# Patient Record
Sex: MALE | Race: WHITE | Employment: FULL TIME | URBAN - METROPOLITAN AREA
[De-identification: names, ages, dates, MRNs, and addresses within clinical notes are randomized per-mention and may not be internally consistent; named-entity substitution may affect disease eponyms.]

---

## 2017-03-01 RX ORDER — CLONAZEPAM 2 MG/1
TABLET ORAL
Qty: 60 TABLET | Refills: 0 | OUTPATIENT
Start: 2017-03-01

## 2017-03-01 NOTE — TELEPHONE ENCOUNTER
Rx request for med Clonazepam 2 mg from "[employer]. Last refilled on 10/27/16. LOV 10/27/16 with Torrie Frankel PA-C for anxiety. Patient to f/u in 3 months.     Future Appointments  Date Time Provider Joellen Reid   3/2/2017 10:30 AM Merline Bame

## 2017-03-02 ENCOUNTER — OFFICE VISIT (OUTPATIENT)
Dept: FAMILY MEDICINE CLINIC | Facility: CLINIC | Age: 42
End: 2017-03-02

## 2017-03-02 ENCOUNTER — APPOINTMENT (OUTPATIENT)
Dept: LAB | Age: 42
End: 2017-03-02
Attending: FAMILY MEDICINE
Payer: COMMERCIAL

## 2017-03-02 VITALS
RESPIRATION RATE: 16 BRPM | SYSTOLIC BLOOD PRESSURE: 116 MMHG | BODY MASS INDEX: 25.16 KG/M2 | HEIGHT: 73 IN | WEIGHT: 189.81 LBS | HEART RATE: 96 BPM | DIASTOLIC BLOOD PRESSURE: 76 MMHG

## 2017-03-02 DIAGNOSIS — I10 HYPERTENSION GOAL BP (BLOOD PRESSURE) < 140/90: ICD-10-CM

## 2017-03-02 DIAGNOSIS — E11.65 TYPE 2 DIABETES MELLITUS WITH HYPERGLYCEMIA, WITHOUT LONG-TERM CURRENT USE OF INSULIN (HCC): ICD-10-CM

## 2017-03-02 DIAGNOSIS — E78.5 HYPERLIPIDEMIA WITH TARGET LDL LESS THAN 130: ICD-10-CM

## 2017-03-02 DIAGNOSIS — J45.40 MODERATE PERSISTENT ASTHMA WITHOUT COMPLICATION: ICD-10-CM

## 2017-03-02 DIAGNOSIS — M54.81 OCCIPITAL NEURALGIA, UNSPECIFIED LATERALITY: ICD-10-CM

## 2017-03-02 DIAGNOSIS — E11.65 TYPE 2 DIABETES MELLITUS WITH HYPERGLYCEMIA, WITHOUT LONG-TERM CURRENT USE OF INSULIN (HCC): Primary | ICD-10-CM

## 2017-03-02 LAB
ALBUMIN SERPL-MCNC: 4.4 G/DL (ref 3.5–4.8)
ALP LIVER SERPL-CCNC: 74 U/L (ref 45–117)
ALT SERPL-CCNC: 50 U/L (ref 17–63)
AST SERPL-CCNC: 25 U/L (ref 15–41)
BILIRUB SERPL-MCNC: 0.5 MG/DL (ref 0.1–2)
BUN BLD-MCNC: 12 MG/DL (ref 8–20)
CALCIUM BLD-MCNC: 9.4 MG/DL (ref 8.3–10.3)
CHLORIDE: 103 MMOL/L (ref 101–111)
CHOLEST SMN-MCNC: 200 MG/DL (ref ?–200)
CO2: 27 MMOL/L (ref 22–32)
CREAT BLD-MCNC: 0.96 MG/DL (ref 0.7–1.3)
CREAT UR-SCNC: 204 MG/DL
EST. AVERAGE GLUCOSE BLD GHB EST-MCNC: 163 MG/DL (ref 68–126)
GLUCOSE BLD-MCNC: 163 MG/DL (ref 70–99)
HBA1C MFR BLD HPLC: 7.3 % (ref ?–5.7)
HDLC SERPL-MCNC: 48 MG/DL (ref 45–?)
HDLC SERPL: 4.17 {RATIO} (ref ?–4.97)
LDLC SERPL CALC-MCNC: 89 MG/DL (ref ?–130)
M PROTEIN MFR SERPL ELPH: 7.6 G/DL (ref 6.1–8.3)
MICROALBUMIN UR-MCNC: 1.81 MG/DL
MICROALBUMIN/CREAT 24H UR-RTO: 8.9 UG/MG (ref ?–30)
NONHDLC SERPL-MCNC: 152 MG/DL (ref ?–130)
POTASSIUM SERPL-SCNC: 3.9 MMOL/L (ref 3.6–5.1)
SODIUM SERPL-SCNC: 139 MMOL/L (ref 136–144)
TRIGLYCERIDES: 315 MG/DL (ref ?–150)
VLDL: 63 MG/DL (ref 5–40)

## 2017-03-02 PROCEDURE — 80061 LIPID PANEL: CPT

## 2017-03-02 PROCEDURE — 82570 ASSAY OF URINE CREATININE: CPT

## 2017-03-02 PROCEDURE — 36415 COLL VENOUS BLD VENIPUNCTURE: CPT

## 2017-03-02 PROCEDURE — 99214 OFFICE O/P EST MOD 30 MIN: CPT | Performed by: PHYSICIAN ASSISTANT

## 2017-03-02 PROCEDURE — 82043 UR ALBUMIN QUANTITATIVE: CPT

## 2017-03-02 PROCEDURE — 83036 HEMOGLOBIN GLYCOSYLATED A1C: CPT

## 2017-03-02 PROCEDURE — 80053 COMPREHEN METABOLIC PANEL: CPT

## 2017-03-02 RX ORDER — CLONAZEPAM 2 MG/1
2 TABLET ORAL 2 TIMES DAILY PRN
Qty: 60 TABLET | Refills: 2 | Status: SHIPPED | OUTPATIENT
Start: 2017-03-02 | End: 2017-06-21

## 2017-03-02 RX ORDER — CARBAMAZEPINE 200 MG/1
200 TABLET ORAL 3 TIMES DAILY PRN
Qty: 270 TABLET | Refills: 3 | Status: SHIPPED | OUTPATIENT
Start: 2017-03-02 | End: 2018-02-28

## 2017-03-02 NOTE — PROGRESS NOTES
CC:  Patient presents with:  Diabetes: Due for eye exam in August. Tests daily. Asthma: ACT 24,AAP pended      HPI: Tish Flood presents for his diabetic check-up, as well as for anxiety, HTN, asthma, occipital neuralgia, and dyslipidemia.  He is doing very well, ClonazePAM 2 MG Oral Tab Take 1 tablet (2 mg total) by mouth 2 (two) times daily as needed for Anxiety. Disp: 60 tablet Rfl: 2   tadalafil 5 MG Oral Tab Take 1 tablet (5 mg total) by mouth daily as needed for Erectile Dysfunction.  Disp: 30 tablet Rfl: 0 hematuria or urgency/frequency   Musculoskeletal: No pain/weakness in arms/legs; normal range of motion   Feet: No lesions, toenail changes, or loss of sensation   Skin: No rashes or new skin lesions; no unusual moles   Neurological:  No weakness, no numbn are no barriers to learning. Medical education done. Outcome: Patient verbalizes understanding. Patient is notified to call with any questions, complications, allergies, or worsening or changing symptoms.   Patient is to call with any side effects or compli

## 2017-03-13 ENCOUNTER — TELEPHONE (OUTPATIENT)
Dept: FAMILY MEDICINE CLINIC | Facility: CLINIC | Age: 42
End: 2017-03-13

## 2017-03-13 NOTE — TELEPHONE ENCOUNTER
Called [facility] pharm, they confirmed the Rx was filled and picked up by patient on 03/02/2017 for 30 days #60 of Clonazepam 2 mgs. Do you still want a new 90 day RX to go to [facility]?

## 2017-03-13 NOTE — TELEPHONE ENCOUNTER
Received fax refill request for Clonazepam 2mg from [facility]. This medication was refilled to [facility] 3/2/17 #60 with 2 additional refills by [email or web address] BRI[facility] at an office vis 3/2/17.   Spoke with Amrit,asking if he does want this prescription t

## 2017-06-16 ENCOUNTER — PATIENT MESSAGE (OUTPATIENT)
Dept: FAMILY MEDICINE CLINIC | Facility: CLINIC | Age: 42
End: 2017-06-16

## 2017-06-16 DIAGNOSIS — Z01.818 PREOPERATIVE CLEARANCE: ICD-10-CM

## 2017-06-16 DIAGNOSIS — I10 HYPERTENSION GOAL BP (BLOOD PRESSURE) < 140/90: ICD-10-CM

## 2017-06-16 DIAGNOSIS — E78.5 HYPERLIPIDEMIA WITH TARGET LDL LESS THAN 130: ICD-10-CM

## 2017-06-16 DIAGNOSIS — E11.65 TYPE 2 DIABETES MELLITUS WITH HYPERGLYCEMIA, WITHOUT LONG-TERM CURRENT USE OF INSULIN (HCC): Primary | ICD-10-CM

## 2017-06-16 NOTE — TELEPHONE ENCOUNTER
From: Chay Vázquez  To: Khanh Stokes MD  Sent: 6/16/2017 9:42 AM CDT  Subject: Test Results Zamzam Rojas,    I need to get my blood work down before my next visit with you on July 31st. There is an ACL near me. Do I need paperwork or can it be sent electronically?     Thanks,    Miguel Avila

## 2017-06-21 RX ORDER — CLONAZEPAM 2 MG/1
2 TABLET ORAL 2 TIMES DAILY PRN
Qty: 60 TABLET | Refills: 2 | OUTPATIENT
Start: 2017-06-21 | End: 2017-07-31

## 2017-06-21 NOTE — TELEPHONE ENCOUNTER
From: Sophia Reich  To:  Gregorio Harris  Sent: 6/16/2017 9:39 AM CDT  Subject: Medication Renewal Request    Original authorizing provider: BRIA Jo would like a refill of the following medications:  ClonazePAM 2 MG Oral

## 2017-06-27 NOTE — TELEPHONE ENCOUNTER
LOV 3/2/17.  Future Appointments  Date Time Provider Joellen Reid   7/31/2017 12:30 PM Fredrick Mills MD [facility]     Refill request for Citalopram. Routed to [facility]

## 2017-06-28 RX ORDER — CITALOPRAM 40 MG/1
TABLET ORAL
Qty: 90 TABLET | Refills: 0 | Status: SHIPPED | OUTPATIENT
Start: 2017-06-28 | End: 2017-07-31

## 2017-06-29 DIAGNOSIS — E78.5 HYPERLIPIDEMIA WITH TARGET LDL LESS THAN 130: ICD-10-CM

## 2017-06-29 DIAGNOSIS — E11.65 TYPE 2 DIABETES MELLITUS WITH HYPERGLYCEMIA, WITHOUT LONG-TERM CURRENT USE OF INSULIN (HCC): ICD-10-CM

## 2017-07-03 RX ORDER — ATORVASTATIN CALCIUM 20 MG/1
TABLET, FILM COATED ORAL
Qty: 90 TABLET | Refills: 0 | Status: SHIPPED | OUTPATIENT
Start: 2017-07-03 | End: 2017-07-31

## 2017-07-03 RX ORDER — METFORMIN HYDROCHLORIDE 500 MG/1
TABLET, EXTENDED RELEASE ORAL
Qty: 180 TABLET | Refills: 0 | Status: SHIPPED | OUTPATIENT
Start: 2017-07-03 | End: 2017-07-31

## 2017-07-13 DIAGNOSIS — I10 ESSENTIAL HYPERTENSION WITH GOAL BLOOD PRESSURE LESS THAN 140/90: ICD-10-CM

## 2017-07-14 RX ORDER — QUINAPRIL 10 MG/1
10 TABLET ORAL
Qty: 30 TABLET | Refills: 0 | Status: SHIPPED
Start: 2017-07-14 | End: 2017-07-31

## 2017-07-14 NOTE — TELEPHONE ENCOUNTER
From: Julián Best  Sent: 7/13/2017 6:01 PM CDT  Subject: Medication Renewal Request    Julián Best would like a refill of the following medications:  Quinapril HCl 10 MG Oral Tab Norm BRIA Keys]    Preferred pharmacy: [place] [phone] -

## 2017-07-29 NOTE — PROGRESS NOTES
HPI:   Oksana Ryan is a 43year old male who presents for recheck of his diabetes.   Lots of stress, still seperated, better activity and diet, labs today,   Patient presents with:  Diabetes  Medication Follow-Up    Annual Physical due on 03[birth year]10/1977  Diabet 10:44 AM   GFR 98 03/02/2017 10:44 AM         Wt Readings from Last 3 Encounters:  07/31/17 : 194 lb  03/02/17 : 189 lb 12.8 oz  10/27/16 : 195 lb    BP Readings from Last 3 Encounters:  07/31/17 : 120/76  03/02/17 : 116/76  10/27/16 : 134/80        HPI exertion  CARDIOVASCULAR: denies chest pain on exertion  GI: denies abdominal pain and denies heartburn  NEURO: denies headaches    EXAM:   /76 (BP Location: Left arm)   Pulse 84   Resp 20   Ht 73\"   Wt 194 lb   BMI 25.60 kg/m²  Estimated body mass noted.   PLAN: will continue present medications, reviewed diet, exercise and weight control, and labs as ordered      As for his cholesterol, Lipids are well controlled, no significant medication side effects noted.    PLAN: will continue present medicatio External Cream      Other Visit Diagnoses     Mild persistent asthma, uncomplicated        Relevant Medications    Budesonide-Formoterol Fumarate (SYMBICORT) 160-4.5 MCG/ACT Inhalation Aerosol    Essential hypertension with goal blood pressure less than 14

## 2017-07-31 ENCOUNTER — OFFICE VISIT (OUTPATIENT)
Dept: FAMILY MEDICINE CLINIC | Facility: CLINIC | Age: 42
End: 2017-07-31

## 2017-07-31 ENCOUNTER — LAB ENCOUNTER (OUTPATIENT)
Dept: LAB | Age: 42
End: 2017-07-31
Attending: FAMILY MEDICINE
Payer: COMMERCIAL

## 2017-07-31 VITALS
BODY MASS INDEX: 25.71 KG/M2 | SYSTOLIC BLOOD PRESSURE: 120 MMHG | WEIGHT: 194 LBS | DIASTOLIC BLOOD PRESSURE: 76 MMHG | HEART RATE: 84 BPM | HEIGHT: 73 IN | RESPIRATION RATE: 20 BRPM

## 2017-07-31 DIAGNOSIS — F41.1 ANXIETY STATE: ICD-10-CM

## 2017-07-31 DIAGNOSIS — Z01.818 PREOPERATIVE CLEARANCE: ICD-10-CM

## 2017-07-31 DIAGNOSIS — I10 HYPERTENSION GOAL BP (BLOOD PRESSURE) < 140/90: ICD-10-CM

## 2017-07-31 DIAGNOSIS — E11.65 TYPE 2 DIABETES MELLITUS WITH HYPERGLYCEMIA, WITHOUT LONG-TERM CURRENT USE OF INSULIN (HCC): ICD-10-CM

## 2017-07-31 DIAGNOSIS — E78.5 HYPERLIPIDEMIA WITH TARGET LDL LESS THAN 130: ICD-10-CM

## 2017-07-31 DIAGNOSIS — J45.40 MODERATE PERSISTENT ASTHMA WITHOUT COMPLICATION: ICD-10-CM

## 2017-07-31 DIAGNOSIS — L40.9 PSORIASIS: ICD-10-CM

## 2017-07-31 DIAGNOSIS — E11.65 TYPE 2 DIABETES MELLITUS WITH HYPERGLYCEMIA, WITHOUT LONG-TERM CURRENT USE OF INSULIN (HCC): Primary | ICD-10-CM

## 2017-07-31 DIAGNOSIS — J45.30 MILD PERSISTENT ASTHMA, UNCOMPLICATED: ICD-10-CM

## 2017-07-31 DIAGNOSIS — I10 ESSENTIAL HYPERTENSION WITH GOAL BLOOD PRESSURE LESS THAN 140/90: ICD-10-CM

## 2017-07-31 PROCEDURE — 36415 COLL VENOUS BLD VENIPUNCTURE: CPT

## 2017-07-31 PROCEDURE — 83036 HEMOGLOBIN GLYCOSYLATED A1C: CPT

## 2017-07-31 PROCEDURE — 80053 COMPREHEN METABOLIC PANEL: CPT

## 2017-07-31 PROCEDURE — 80061 LIPID PANEL: CPT

## 2017-07-31 PROCEDURE — 99214 OFFICE O/P EST MOD 30 MIN: CPT | Performed by: FAMILY MEDICINE

## 2017-07-31 PROCEDURE — 85025 COMPLETE CBC W/AUTO DIFF WBC: CPT

## 2017-07-31 RX ORDER — CITALOPRAM 40 MG/1
40 TABLET ORAL
Qty: 90 TABLET | Refills: 3 | Status: SHIPPED | OUTPATIENT
Start: 2017-07-31 | End: 2018-08-29

## 2017-07-31 RX ORDER — ATORVASTATIN CALCIUM 20 MG/1
20 TABLET, FILM COATED ORAL
Qty: 90 TABLET | Refills: 3 | Status: SHIPPED | OUTPATIENT
Start: 2017-07-31 | End: 2018-10-08

## 2017-07-31 RX ORDER — QUINAPRIL 10 MG/1
10 TABLET ORAL
Qty: 90 TABLET | Refills: 3 | Status: SHIPPED | OUTPATIENT
Start: 2017-07-31 | End: 2018-07-27

## 2017-07-31 RX ORDER — METFORMIN HYDROCHLORIDE 500 MG/1
1000 TABLET, EXTENDED RELEASE ORAL
Qty: 180 TABLET | Refills: 3 | Status: SHIPPED | OUTPATIENT
Start: 2017-07-31 | End: 2018-10-08

## 2017-07-31 RX ORDER — CLONAZEPAM 2 MG/1
2 TABLET ORAL 2 TIMES DAILY PRN
Qty: 180 TABLET | Refills: 1 | Status: SHIPPED | OUTPATIENT
Start: 2017-07-31 | End: 2018-02-28

## 2017-07-31 RX ORDER — BETAMETHASONE DIPROPIONATE 0.5 MG/G
1 CREAM TOPICAL 2 TIMES DAILY
Qty: 50 G | Refills: 1 | Status: SHIPPED | OUTPATIENT
Start: 2017-07-31 | End: 2018-10-08 | Stop reason: ALTCHOICE

## 2017-07-31 RX ORDER — BUDESONIDE AND FORMOTEROL FUMARATE DIHYDRATE 160; 4.5 UG/1; UG/1
2 AEROSOL RESPIRATORY (INHALATION) 2 TIMES DAILY
Qty: 3 INHALER | Refills: 3 | Status: SHIPPED | OUTPATIENT
Start: 2017-07-31 | End: 2018-10-08

## 2017-08-01 LAB
ALBUMIN SERPL-MCNC: 4.5 G/DL (ref 3.5–4.8)
ALP LIVER SERPL-CCNC: 57 U/L (ref 45–117)
ALT SERPL-CCNC: 53 U/L (ref 17–63)
AST SERPL-CCNC: 25 U/L (ref 15–41)
BASOPHILS # BLD AUTO: 0.09 X10(3) UL (ref 0–0.1)
BASOPHILS NFR BLD AUTO: 1.4 %
BILIRUB SERPL-MCNC: 0.7 MG/DL (ref 0.1–2)
BUN BLD-MCNC: 11 MG/DL (ref 8–20)
CALCIUM BLD-MCNC: 9.4 MG/DL (ref 8.3–10.3)
CHLORIDE: 103 MMOL/L (ref 101–111)
CHOLEST SMN-MCNC: 213 MG/DL (ref ?–200)
CO2: 26 MMOL/L (ref 22–32)
CREAT BLD-MCNC: 0.84 MG/DL (ref 0.7–1.3)
EOSINOPHIL # BLD AUTO: 0.28 X10(3) UL (ref 0–0.3)
EOSINOPHIL NFR BLD AUTO: 4.3 %
ERYTHROCYTE [DISTWIDTH] IN BLOOD BY AUTOMATED COUNT: 12.8 % (ref 11.5–16)
EST. AVERAGE GLUCOSE BLD GHB EST-MCNC: 128 MG/DL (ref 68–126)
GLUCOSE BLD-MCNC: 132 MG/DL (ref 70–99)
HBA1C MFR BLD HPLC: 6.1 % (ref ?–5.7)
HCT VFR BLD AUTO: 48.4 % (ref 37–53)
HDLC SERPL-MCNC: 40 MG/DL (ref 45–?)
HDLC SERPL: 5.33 {RATIO} (ref ?–4.97)
HGB BLD-MCNC: 16.2 G/DL (ref 13–17)
IMMATURE GRANULOCYTE COUNT: 0.01 X10(3) UL (ref 0–1)
IMMATURE GRANULOCYTE RATIO %: 0.2 %
LDLC SERPL CALC-MCNC: 110 MG/DL (ref ?–130)
LDLC SERPL-MCNC: 63 MG/DL (ref 5–40)
LYMPHOCYTES # BLD AUTO: 1.78 X10(3) UL (ref 0.9–4)
LYMPHOCYTES NFR BLD AUTO: 27.3 %
M PROTEIN MFR SERPL ELPH: 7.5 G/DL (ref 6.1–8.3)
MCH RBC QN AUTO: 31.8 PG (ref 27–33.2)
MCHC RBC AUTO-ENTMCNC: 33.5 G/DL (ref 31–37)
MCV RBC AUTO: 95.1 FL (ref 80–99)
MONOCYTES # BLD AUTO: 0.36 X10(3) UL (ref 0.1–0.6)
MONOCYTES NFR BLD AUTO: 5.5 %
NEUTROPHIL ABS PRELIM: 4 X10 (3) UL (ref 1.3–6.7)
NEUTROPHILS # BLD AUTO: 4 X10(3) UL (ref 1.3–6.7)
NEUTROPHILS NFR BLD AUTO: 61.3 %
NONHDLC SERPL-MCNC: 173 MG/DL (ref ?–130)
PLATELET # BLD AUTO: 221 10(3)UL (ref 150–450)
POTASSIUM SERPL-SCNC: 4.1 MMOL/L (ref 3.6–5.1)
RBC # BLD AUTO: 5.09 X10(6)UL (ref 4.3–5.7)
RED CELL DISTRIBUTION WIDTH-SD: 45.2 FL (ref 35.1–46.3)
SODIUM SERPL-SCNC: 138 MMOL/L (ref 136–144)
TRIGLYCERIDES: 316 MG/DL (ref ?–150)
WBC # BLD AUTO: 6.5 X10(3) UL (ref 4–13)

## 2018-01-31 DIAGNOSIS — Z91.09 ENVIRONMENTAL ALLERGIES: ICD-10-CM

## 2018-01-31 DIAGNOSIS — J45.40 MODERATE PERSISTENT ASTHMA WITHOUT COMPLICATION: ICD-10-CM

## 2018-02-01 RX ORDER — ALBUTEROL SULFATE 90 UG/1
2 AEROSOL, METERED RESPIRATORY (INHALATION) EVERY 6 HOURS PRN
Qty: 1 INHALER | Refills: 2 | Status: SHIPPED | OUTPATIENT
Start: 2018-02-01 | End: 2018-02-28

## 2018-02-01 NOTE — TELEPHONE ENCOUNTER
From: Eve Show  Sent: 1/31/2018 9:32 AM CST  Subject: Medication Renewal Request    Eve Prasad would like a refill of the following medications:     Albuterol Sulfate HFA (PROAIR HFA) 108 (90 BASE) MCG/ACT Inhalation Aero Sollive Hsu MD]   Handy

## 2018-02-28 ENCOUNTER — OFFICE VISIT (OUTPATIENT)
Dept: FAMILY MEDICINE CLINIC | Facility: CLINIC | Age: 43
End: 2018-02-28

## 2018-02-28 VITALS
SYSTOLIC BLOOD PRESSURE: 132 MMHG | HEART RATE: 88 BPM | RESPIRATION RATE: 16 BRPM | BODY MASS INDEX: 24.89 KG/M2 | TEMPERATURE: 98 F | DIASTOLIC BLOOD PRESSURE: 70 MMHG | HEIGHT: 74.25 IN | WEIGHT: 196 LBS

## 2018-02-28 DIAGNOSIS — I10 HYPERTENSION GOAL BP (BLOOD PRESSURE) < 140/90: ICD-10-CM

## 2018-02-28 DIAGNOSIS — J45.40 MODERATE PERSISTENT ASTHMA WITHOUT COMPLICATION: ICD-10-CM

## 2018-02-28 DIAGNOSIS — Z13.1 ENCOUNTER FOR SCREENING EXAMINATION FOR IMPAIRED GLUCOSE REGULATION AND DIABETES MELLITUS: ICD-10-CM

## 2018-02-28 DIAGNOSIS — M54.81 OCCIPITAL NEURALGIA, UNSPECIFIED LATERALITY: ICD-10-CM

## 2018-02-28 DIAGNOSIS — Z79.899 LONG-TERM CURRENT USE OF HIGH RISK MEDICATION OTHER THAN ANTICOAGULANT: ICD-10-CM

## 2018-02-28 DIAGNOSIS — Z91.09 ENVIRONMENTAL ALLERGIES: ICD-10-CM

## 2018-02-28 DIAGNOSIS — E78.5 HYPERLIPIDEMIA WITH TARGET LDL LESS THAN 130: ICD-10-CM

## 2018-02-28 DIAGNOSIS — Z13.220 SCREENING FOR LIPID DISORDERS: ICD-10-CM

## 2018-02-28 DIAGNOSIS — Z13.29 SCREENING FOR THYROID DISORDER: ICD-10-CM

## 2018-02-28 DIAGNOSIS — R73.9 ELEVATED SERUM GLUCOSE: ICD-10-CM

## 2018-02-28 DIAGNOSIS — E11.65 TYPE 2 DIABETES MELLITUS WITH HYPERGLYCEMIA, WITHOUT LONG-TERM CURRENT USE OF INSULIN (HCC): Primary | ICD-10-CM

## 2018-02-28 DIAGNOSIS — Z00.00 LABORATORY EXAMINATION ORDERED AS PART OF A ROUTINE GENERAL MEDICAL EXAMINATION: ICD-10-CM

## 2018-02-28 DIAGNOSIS — F41.1 ANXIETY STATE: ICD-10-CM

## 2018-02-28 PROCEDURE — 99214 OFFICE O/P EST MOD 30 MIN: CPT | Performed by: FAMILY MEDICINE

## 2018-02-28 RX ORDER — CARBAMAZEPINE 200 MG/1
200 TABLET ORAL 3 TIMES DAILY PRN
Qty: 270 TABLET | Refills: 3 | Status: SHIPPED | OUTPATIENT
Start: 2018-02-28

## 2018-02-28 RX ORDER — CLONAZEPAM 2 MG/1
2 TABLET ORAL 2 TIMES DAILY PRN
Qty: 180 TABLET | Refills: 1 | Status: SHIPPED | OUTPATIENT
Start: 2018-02-28 | End: 2018-05-01

## 2018-02-28 RX ORDER — ALBUTEROL SULFATE 90 UG/1
2 AEROSOL, METERED RESPIRATORY (INHALATION) EVERY 6 HOURS PRN
Qty: 3 INHALER | Refills: 2 | Status: SHIPPED | OUTPATIENT
Start: 2018-02-28 | End: 2019-06-11

## 2018-02-28 NOTE — PROGRESS NOTES
HPI:   Robert López is a 43year old male who presents for recheck of his diabetes.     Patient presents with:  Diabetes  Asthma    Annual Physical due on 03[birth year]10/1977  Pneumococcal PPSV23 Medium Risk Adult(1 of 1 - PPSV23) due on 03/10/1994  Diabetes Care Dil PM   HDL 40 (L) 07/31/2017 01:23 PM    07/31/2017 01:23 PM   CHOLEST 213 (H) 07/31/2017 01:23 PM   TRIG 316 (H) 07/31/2017 01:23 PM   VLDL 63 (H) 07/31/2017 01:23 PM   BUN 11 07/31/2017 01:23 PM   CREATSERUM 0.84 07/31/2017 01:23 PM    07/31/ mg total) by mouth 2 (two) times daily as needed for Anxiety. [DISCONTINUED] carbamazepine 200 MG Oral Tab Take 1 tablet (200 mg total) by mouth 3 (three) times daily as needed (neuralgia pain).      No current facility-administered medications on file pr weight by increased dietary compliance and exercise, see ophthalmology soon, check feet daily and will check labs as ordered. As for his hypertension, Blood Pressure is well controlled, no significant medication side effects noted.    PLAN: will continu ClonazePAM 2 MG Oral Tab      Other Visit Diagnoses     Environmental allergies        Relevant Medications    Albuterol Sulfate HFA (PROAIR HFA) 108 (90 Base) MCG/ACT Inhalation Aero Soln    Screening for thyroid disorder        Relevant Orders    TSH W R

## 2018-05-01 DIAGNOSIS — F41.1 ANXIETY STATE: ICD-10-CM

## 2018-05-01 NOTE — TELEPHONE ENCOUNTER
From: Gala Prasad  Sent: 5/1/2018 2:06 PM CDT  Subject: Medication Renewal Request    Gala Prasad would like a refill of the following medications:     ClonazePAM 2 MG Oral Tab Aileen Seymour MD]   Patient Comment: [facility]

## 2018-05-02 RX ORDER — CLONAZEPAM 2 MG/1
2 TABLET ORAL 2 TIMES DAILY PRN
Qty: 180 TABLET | Refills: 1 | OUTPATIENT
Start: 2018-05-02 | End: 2018-10-08

## 2018-07-27 DIAGNOSIS — I10 ESSENTIAL HYPERTENSION WITH GOAL BLOOD PRESSURE LESS THAN 140/90: ICD-10-CM

## 2018-07-31 RX ORDER — QUINAPRIL 10 MG/1
TABLET ORAL
Qty: 90 TABLET | Refills: 3 | Status: SHIPPED | OUTPATIENT
Start: 2018-07-31 | End: 2019-07-28

## 2018-08-29 DIAGNOSIS — F41.1 ANXIETY STATE: ICD-10-CM

## 2018-08-29 RX ORDER — CITALOPRAM 40 MG/1
40 TABLET ORAL DAILY
Qty: 90 TABLET | Refills: 0 | Status: SHIPPED | OUTPATIENT
Start: 2018-08-29 | End: 2018-12-02

## 2018-09-01 NOTE — TELEPHONE ENCOUNTER
Spoke to patient, pt aware one refill sent to pharmacy. Patient [facility]

## 2018-10-08 ENCOUNTER — OFFICE VISIT (OUTPATIENT)
Dept: FAMILY MEDICINE CLINIC | Facility: CLINIC | Age: 43
End: 2018-10-08
Payer: COMMERCIAL

## 2018-10-08 VITALS
HEIGHT: 74 IN | WEIGHT: 194 LBS | BODY MASS INDEX: 24.9 KG/M2 | SYSTOLIC BLOOD PRESSURE: 142 MMHG | TEMPERATURE: 98 F | DIASTOLIC BLOOD PRESSURE: 90 MMHG | HEART RATE: 110 BPM | RESPIRATION RATE: 20 BRPM

## 2018-10-08 DIAGNOSIS — J45.30 MILD PERSISTENT ASTHMA, UNCOMPLICATED: ICD-10-CM

## 2018-10-08 DIAGNOSIS — J45.40 MODERATE PERSISTENT ASTHMA WITHOUT COMPLICATION: ICD-10-CM

## 2018-10-08 DIAGNOSIS — F41.1 ANXIETY STATE: ICD-10-CM

## 2018-10-08 DIAGNOSIS — I10 HYPERTENSION GOAL BP (BLOOD PRESSURE) < 140/90: ICD-10-CM

## 2018-10-08 DIAGNOSIS — E11.65 TYPE 2 DIABETES MELLITUS WITH HYPERGLYCEMIA, WITHOUT LONG-TERM CURRENT USE OF INSULIN (HCC): Primary | ICD-10-CM

## 2018-10-08 DIAGNOSIS — E78.5 HYPERLIPIDEMIA WITH TARGET LDL LESS THAN 130: ICD-10-CM

## 2018-10-08 PROCEDURE — 99214 OFFICE O/P EST MOD 30 MIN: CPT | Performed by: FAMILY MEDICINE

## 2018-10-08 PROCEDURE — 90471 IMMUNIZATION ADMIN: CPT | Performed by: FAMILY MEDICINE

## 2018-10-08 PROCEDURE — 90686 IIV4 VACC NO PRSV 0.5 ML IM: CPT | Performed by: FAMILY MEDICINE

## 2018-10-08 RX ORDER — ATORVASTATIN CALCIUM 20 MG/1
20 TABLET, FILM COATED ORAL
Qty: 90 TABLET | Refills: 3 | Status: SHIPPED | OUTPATIENT
Start: 2018-10-08 | End: 2018-10-08

## 2018-10-08 RX ORDER — BUDESONIDE AND FORMOTEROL FUMARATE DIHYDRATE 160; 4.5 UG/1; UG/1
2 AEROSOL RESPIRATORY (INHALATION) 2 TIMES DAILY
Qty: 3 INHALER | Refills: 3 | Status: SHIPPED | OUTPATIENT
Start: 2018-10-08 | End: 2019-05-17 | Stop reason: ALTCHOICE

## 2018-10-08 RX ORDER — BUDESONIDE AND FORMOTEROL FUMARATE DIHYDRATE 160; 4.5 UG/1; UG/1
2 AEROSOL RESPIRATORY (INHALATION) 2 TIMES DAILY
Qty: 3 INHALER | Refills: 3 | Status: SHIPPED | OUTPATIENT
Start: 2018-10-08 | End: 2018-10-08

## 2018-10-08 RX ORDER — METFORMIN HYDROCHLORIDE 500 MG/1
1000 TABLET, EXTENDED RELEASE ORAL
Qty: 180 TABLET | Refills: 3 | Status: SHIPPED | OUTPATIENT
Start: 2018-10-08 | End: 2018-10-08

## 2018-10-08 RX ORDER — DILTIAZEM HYDROCHLORIDE EXTENDED-RELEASE TABLETS 180 MG/1
180 TABLET, EXTENDED RELEASE ORAL DAILY
Qty: 90 TABLET | Refills: 1 | Status: SHIPPED | OUTPATIENT
Start: 2018-10-08 | End: 2019-04-09

## 2018-10-08 RX ORDER — METFORMIN HYDROCHLORIDE 500 MG/1
1000 TABLET, EXTENDED RELEASE ORAL
Qty: 180 TABLET | Refills: 3 | Status: SHIPPED | OUTPATIENT
Start: 2018-10-08 | End: 2019-11-07

## 2018-10-08 RX ORDER — ATORVASTATIN CALCIUM 20 MG/1
20 TABLET, FILM COATED ORAL
Qty: 90 TABLET | Refills: 3 | Status: SHIPPED | OUTPATIENT
Start: 2018-10-08 | End: 2019-09-23

## 2018-10-08 RX ORDER — CLONAZEPAM 2 MG/1
2 TABLET ORAL 2 TIMES DAILY PRN
Qty: 180 TABLET | Refills: 1 | Status: SHIPPED | OUTPATIENT
Start: 2018-10-08 | End: 2019-05-17

## 2018-10-08 NOTE — PROGRESS NOTES
HPI:   Patient presents with:  Diabetes    Rae Albright is a 37year old male who presents for recheck of his diabetes. Subjective    Lots of stress.    Lab Results   Component Value Date    A1C 6.1 (H) 07/31/2017   A1c 8.0 at [facility].     Annual Physical d and normal heart sounds. No murmur heard. Pulses:       Dorsalis pedis pulses are 2+ on the right side, and 2+ on the left side. Posterior tibial pulses are 2+ on the right side, and 2+ on the left side. Edema not present.   Pulmonary/Chest: E management.     Cholesterol Lowering Medications          atorvastatin 20 MG Oral Tab                 Relevant Medications    atorvastatin 20 MG Oral Tab       Respiratory    Moderate persistent asthma    Overview     Symbicort 160 BID, Proair HFA         C

## 2018-10-09 NOTE — ASSESSMENT & PLAN NOTE
As for his Diabetes, it is no significant medication side effects noted, borderline controlled.      Recommendations are: continue present meds, lose weight by increased dietary compliance and exercise, will check labs as ordered and diet and exercise, reas

## 2018-10-09 NOTE — ASSESSMENT & PLAN NOTE
Stable, Continue present management.     Cholesterol Lowering Medications          atorvastatin 20 MG Oral Tab

## 2018-12-02 DIAGNOSIS — F41.1 ANXIETY STATE: ICD-10-CM

## 2018-12-03 RX ORDER — CITALOPRAM 40 MG/1
TABLET ORAL
Qty: 90 TABLET | Refills: 1 | Status: SHIPPED | OUTPATIENT
Start: 2018-12-03 | End: 2019-05-28

## 2018-12-03 NOTE — TELEPHONE ENCOUNTER
LOV 10/8/2018     Future Appointments   Date Time Provider Joellen Jeanette   4/8/2019  1:00 PM Dia Laureano MD [facility]        Refill request for:    Requested Prescriptions     Pending Prescriptions Disp Refills   • CITALOPRAM HYDROBROMIDE 40

## 2019-04-09 DIAGNOSIS — I10 HYPERTENSION GOAL BP (BLOOD PRESSURE) < 140/90: ICD-10-CM

## 2019-04-09 RX ORDER — DILTIAZEM HYDROCHLORIDE EXTENDED-RELEASE TABLETS 180 MG/1
TABLET, EXTENDED RELEASE ORAL
Qty: 90 TABLET | Refills: 0 | Status: SHIPPED | OUTPATIENT
Start: 2019-04-09 | End: 2019-07-15

## 2019-04-09 NOTE — TELEPHONE ENCOUNTER
Medication refilled per protocol.      Requested Prescriptions     Signed Prescriptions Disp Refills   • DILTIAZEM HCL ER COATED BEADS 180 MG Oral Tablet 24 Hr 90 tablet 0     Sig: TAKE 1 TABLET DAILY     Authorizing Provider: Peggy Girard     Ordering User

## 2019-05-15 LAB
AMB EXT CHOLESTEROL, TOTAL: 234 MG/DL (ref 100–199)
AMB EXT HDL CHOLESTEROL: 49 MG/DL (ref 39–100)
AMB EXT LDL CHOLESTEROL, DIRECT: 115 MG/DL (ref 0–99)
AMB EXT TRIGLYCERIDES: 351 MG/DL (ref 0–149)
AMB EXT VLDL: 70 MG/DL (ref 5–40)

## 2019-05-17 ENCOUNTER — OFFICE VISIT (OUTPATIENT)
Dept: FAMILY MEDICINE CLINIC | Facility: CLINIC | Age: 44
End: 2019-05-17
Payer: COMMERCIAL

## 2019-05-17 VITALS
HEIGHT: 73 IN | WEIGHT: 193 LBS | BODY MASS INDEX: 25.58 KG/M2 | TEMPERATURE: 98 F | DIASTOLIC BLOOD PRESSURE: 83 MMHG | SYSTOLIC BLOOD PRESSURE: 130 MMHG | HEART RATE: 100 BPM | RESPIRATION RATE: 12 BRPM

## 2019-05-17 DIAGNOSIS — I10 ESSENTIAL HYPERTENSION: Primary | Chronic | ICD-10-CM

## 2019-05-17 DIAGNOSIS — J45.30 MILD PERSISTENT ASTHMA, UNCOMPLICATED: ICD-10-CM

## 2019-05-17 DIAGNOSIS — E11.65 TYPE 2 DIABETES MELLITUS WITH HYPERGLYCEMIA, WITHOUT LONG-TERM CURRENT USE OF INSULIN (HCC): ICD-10-CM

## 2019-05-17 DIAGNOSIS — F41.1 ANXIETY STATE: ICD-10-CM

## 2019-05-17 DIAGNOSIS — E78.5 HYPERLIPIDEMIA WITH TARGET LDL LESS THAN 130: ICD-10-CM

## 2019-05-17 DIAGNOSIS — J45.40 MODERATE PERSISTENT ASTHMA WITHOUT COMPLICATION: ICD-10-CM

## 2019-05-17 PROCEDURE — 99214 OFFICE O/P EST MOD 30 MIN: CPT | Performed by: FAMILY MEDICINE

## 2019-05-17 PROCEDURE — 83036 HEMOGLOBIN GLYCOSYLATED A1C: CPT | Performed by: FAMILY MEDICINE

## 2019-05-17 RX ORDER — FLUTICASONE PROPIONATE AND SALMETEROL 250; 50 UG/1; UG/1
1 POWDER RESPIRATORY (INHALATION) EVERY 12 HOURS SCHEDULED
Qty: 3 EACH | Refills: 4 | Status: SHIPPED | OUTPATIENT
Start: 2019-05-17

## 2019-05-17 RX ORDER — CLONAZEPAM 2 MG/1
2 TABLET ORAL 2 TIMES DAILY PRN
Qty: 180 TABLET | Refills: 1 | Status: SHIPPED | OUTPATIENT
Start: 2019-05-17 | End: 2019-06-27

## 2019-05-17 RX ORDER — AZITHROMYCIN 250 MG/1
TABLET, FILM COATED ORAL
Qty: 6 TABLET | Refills: 0 | Status: SHIPPED | OUTPATIENT
Start: 2019-05-17 | End: 2019-11-22 | Stop reason: ALTCHOICE

## 2019-05-17 NOTE — PROGRESS NOTES
HPI:   Patient presents with:  Diabetes: f/u     Oksana Ryan is a 40year old male who presents for recheck of his diabetes.   Subjective    URI sx as well  Lab Results   Component Value Date    A1C 7.5 (A) 05/17/2019       Annual Physical due on 03[birth year]10/1977 deformity. Feet: diabetic foot exam performed    Right Foot:   Protective Sensation: 6 sites tested. 6 sites sensed. Skin Integrity: Negative for ulcer. Left Foot:   Protective Sensation: 6 sites tested. 6 sites sensed.    Skin Integrity: Negative for METABOLIC PANEL (14)    LIPID PANEL    HEMOGLOBIN A1C    MICROALB/CREAT RATIO, RANDOM URINE       Other    Anxiety state    Overview     tegrotol and clonazepam, trazodone 100 and citalopram 40         Relevant Medications    clonazePAM 2 MG Oral Tab

## 2019-05-17 NOTE — ASSESSMENT & PLAN NOTE
Stable, Continue present management.     Blood Pressure and Cardiac Medications          DILTIAZEM HCL ER COATED BEADS 180 MG Oral Tablet 24 Hr    QUINAPRIL HCL 10 MG Oral Tab

## 2019-05-28 DIAGNOSIS — F41.1 ANXIETY STATE: ICD-10-CM

## 2019-05-28 RX ORDER — CITALOPRAM 40 MG/1
TABLET ORAL
Qty: 90 TABLET | Refills: 4 | Status: SHIPPED | OUTPATIENT
Start: 2019-05-28 | End: 2020-08-20

## 2019-05-28 NOTE — TELEPHONE ENCOUNTER
LOV 5/17/2019     Appointment scheduled: 11/22/2019 Damien Pina MD     Refill request for:    Requested Prescriptions     Pending Prescriptions Disp Refills   • CITALOPRAM HYDROBROMIDE 40 MG Oral Tab [Pharmacy Med Name: CITALOPRAM HBR TABS 40MG] 90 table

## 2019-06-11 ENCOUNTER — PATIENT MESSAGE (OUTPATIENT)
Dept: FAMILY MEDICINE CLINIC | Facility: CLINIC | Age: 44
End: 2019-06-11

## 2019-06-11 DIAGNOSIS — J45.40 MODERATE PERSISTENT ASTHMA WITHOUT COMPLICATION: ICD-10-CM

## 2019-06-11 DIAGNOSIS — Z91.09 ENVIRONMENTAL ALLERGIES: ICD-10-CM

## 2019-06-11 RX ORDER — ALBUTEROL SULFATE 90 UG/1
2 AEROSOL, METERED RESPIRATORY (INHALATION) EVERY 6 HOURS PRN
Qty: 3 INHALER | Refills: 2 | Status: SHIPPED | OUTPATIENT
Start: 2019-06-11 | End: 2020-08-13

## 2019-06-11 NOTE — TELEPHONE ENCOUNTER
From: Charbel Vega  To: Meet Sahni MD  Sent: 6/11/2019 1:08 PM CDT  Subject: Test Results Latoya Wolfe,    Did my lipid test every come to your office? I remember they didn't test everything they were supposed to but I haven't seen the lipid results. Thanks!  Ronald Wu

## 2019-06-11 NOTE — TELEPHONE ENCOUNTER
From: Guzman Grey  To: Jian Toledo MD  Sent: 6/11/2019 2:58 PM CDT  Subject: Prescription Question    Hello, I tried using the Request Refill option on the website. I thought I had requested my ProAir Albuterol but I got a message back that my prescription was approved for Citalopram.     That works but I need ProAir Albuterol sent to my [facility] in [place], [place] on [address]. Not sure what happened. Thanks!  Arona Ciro

## 2019-06-13 RX ORDER — ALBUTEROL SULFATE 90 UG/1
AEROSOL, METERED RESPIRATORY (INHALATION)
Qty: 8.5 G | Refills: 0 | OUTPATIENT
Start: 2019-06-13

## 2019-06-13 RX ORDER — ALBUTEROL SULFATE 90 UG/1
2 AEROSOL, METERED RESPIRATORY (INHALATION) EVERY 6 HOURS PRN
Qty: 3 INHALER | Refills: 2 | OUTPATIENT
Start: 2019-06-13

## 2019-06-13 NOTE — TELEPHONE ENCOUNTER
The proair refill was already sent to local [facility], now [facility], [facility]

## 2019-06-27 DIAGNOSIS — F41.1 ANXIETY STATE: ICD-10-CM

## 2019-06-27 RX ORDER — CLONAZEPAM 2 MG/1
2 TABLET ORAL 2 TIMES DAILY PRN
Qty: 180 TABLET | Refills: 1 | OUTPATIENT
Start: 2019-06-27 | End: 2019-12-30

## 2019-06-27 NOTE — TELEPHONE ENCOUNTER
LOV 5/17/2019         Appointment scheduled: 11/22/2019 Dia Laureano MD     Refill request for:    Requested Prescriptions     Pending Prescriptions Disp Refills   • triamcinolone acetonide 0.1 % External Cream 170 g 2     Sig: Apply topically 2 (two) charmaine

## 2019-07-15 DIAGNOSIS — I10 HYPERTENSION GOAL BP (BLOOD PRESSURE) < 140/90: ICD-10-CM

## 2019-07-16 NOTE — TELEPHONE ENCOUNTER
Pt requesting refill on diltiazem 180. lov 5.17.19, next office visit 11/22/2019. Cmp overdue/already ordered. Is it ok to refill? Please advise.  Routed to Dr Kamaljit Ma

## 2019-07-28 DIAGNOSIS — I10 ESSENTIAL HYPERTENSION WITH GOAL BLOOD PRESSURE LESS THAN 140/90: ICD-10-CM

## 2019-08-12 RX ORDER — QUINAPRIL 10 MG/1
TABLET ORAL
Qty: 90 TABLET | Refills: 3 | Status: SHIPPED | OUTPATIENT
Start: 2019-08-12 | End: 2020-07-23

## 2019-09-23 DIAGNOSIS — E78.5 HYPERLIPIDEMIA WITH TARGET LDL LESS THAN 130: ICD-10-CM

## 2019-09-23 RX ORDER — ATORVASTATIN CALCIUM 20 MG/1
TABLET, FILM COATED ORAL
Qty: 90 TABLET | Refills: 4 | Status: SHIPPED | OUTPATIENT
Start: 2019-09-23 | End: 2020-09-24

## 2019-09-23 NOTE — TELEPHONE ENCOUNTER
Requested Prescriptions     Pending Prescriptions Disp Refills   • ATORVASTATIN 20 MG Oral Tab [Pharmacy Med Name: ATORVASTATIN TABS 20MG] 90 tablet 4     Sig: TAKE 1 TABLET DAILY     LOV 5/17/2019     Patient was asked to follow-up in: 6 months    Appoint

## 2019-11-07 DIAGNOSIS — E11.65 TYPE 2 DIABETES MELLITUS WITH HYPERGLYCEMIA, WITHOUT LONG-TERM CURRENT USE OF INSULIN (HCC): ICD-10-CM

## 2019-11-08 ENCOUNTER — PATIENT MESSAGE (OUTPATIENT)
Dept: FAMILY MEDICINE CLINIC | Facility: CLINIC | Age: 44
End: 2019-11-08

## 2019-11-08 RX ORDER — METFORMIN HYDROCHLORIDE 500 MG/1
TABLET, EXTENDED RELEASE ORAL
Qty: 180 TABLET | Refills: 0 | Status: SHIPPED | OUTPATIENT
Start: 2019-11-08 | End: 2019-11-22

## 2019-11-08 NOTE — TELEPHONE ENCOUNTER
Requested Prescriptions     Pending Prescriptions Disp Refills   • METFORMIN HCL  MG Oral Tablet 24 Hr [Pharmacy Med Name: METFORMIN HCL ER TABS 500MG] 180 tablet 4     Sig: TAKE 2 TABLETS DAILY WITH BREAKFAST     LOV 5/17/2019   HgBA1C 7.5 (5/17/19)

## 2019-11-08 NOTE — TELEPHONE ENCOUNTER
From: Charbel Vega  To: Debora Johnson MD  Sent: 11/8/2019 8:53 AM CST  Subject: Other    Hello, before my appointment on 11/22. ... where can I print the Diabetes Eye Exam form? Thanks!  Ronald Wu

## 2019-11-19 ENCOUNTER — TELEPHONE (OUTPATIENT)
Dept: FAMILY MEDICINE CLINIC | Facility: CLINIC | Age: 44
End: 2019-11-19

## 2019-11-20 NOTE — TELEPHONE ENCOUNTER
Future Appointments   Date Time Provider Joellen Reid   11/22/2019  8:45 AM Xin Sarmiento MD [facility] 3 [facility]     Coming in on the 22nd, received today from [facility] 1, glucose 194, creatinine 0.85 with GFR of 106.   AST 26 ALT 40, lipids 216, Cheikh 321,

## 2019-11-22 ENCOUNTER — OFFICE VISIT (OUTPATIENT)
Dept: FAMILY MEDICINE CLINIC | Facility: CLINIC | Age: 44
End: 2019-11-22
Payer: COMMERCIAL

## 2019-11-22 VITALS
BODY MASS INDEX: 25.03 KG/M2 | RESPIRATION RATE: 16 BRPM | HEART RATE: 100 BPM | SYSTOLIC BLOOD PRESSURE: 130 MMHG | DIASTOLIC BLOOD PRESSURE: 78 MMHG | HEIGHT: 74 IN | WEIGHT: 195 LBS | TEMPERATURE: 97 F

## 2019-11-22 DIAGNOSIS — E78.2 MIXED HYPERLIPIDEMIA: ICD-10-CM

## 2019-11-22 DIAGNOSIS — Z00.00 ANNUAL PHYSICAL EXAM: Primary | ICD-10-CM

## 2019-11-22 DIAGNOSIS — E11.65 TYPE 2 DIABETES MELLITUS WITH HYPERGLYCEMIA, WITHOUT LONG-TERM CURRENT USE OF INSULIN (HCC): ICD-10-CM

## 2019-11-22 DIAGNOSIS — I10 ESSENTIAL HYPERTENSION: Chronic | ICD-10-CM

## 2019-11-22 PROCEDURE — 90471 IMMUNIZATION ADMIN: CPT | Performed by: FAMILY MEDICINE

## 2019-11-22 PROCEDURE — 99396 PREV VISIT EST AGE 40-64: CPT | Performed by: FAMILY MEDICINE

## 2019-11-22 PROCEDURE — 90686 IIV4 VACC NO PRSV 0.5 ML IM: CPT | Performed by: FAMILY MEDICINE

## 2019-11-22 PROCEDURE — 99213 OFFICE O/P EST LOW 20 MIN: CPT | Performed by: FAMILY MEDICINE

## 2019-11-22 PROCEDURE — 90472 IMMUNIZATION ADMIN EACH ADD: CPT | Performed by: FAMILY MEDICINE

## 2019-11-22 PROCEDURE — 90715 TDAP VACCINE 7 YRS/> IM: CPT | Performed by: FAMILY MEDICINE

## 2019-11-22 RX ORDER — METFORMIN HYDROCHLORIDE 500 MG/1
1000 TABLET, EXTENDED RELEASE ORAL 2 TIMES DAILY WITH MEALS
Qty: 360 TABLET | Refills: 3 | Status: SHIPPED | OUTPATIENT
Start: 2019-11-22 | End: 2020-09-18

## 2019-11-22 RX ORDER — CETIRIZINE HYDROCHLORIDE 10 MG/1
10 TABLET ORAL DAILY
COMMUNITY

## 2019-11-22 NOTE — PROGRESS NOTES
Ángel Taveras is a 40year old male who presents for a complete physical exam.   HPI:   Patient presents with:  Physical      His last annual assessment has been over 1 year: Annual Physical due on 03/10/1978       Pt complains of doing well.      He has neve 05/15/2019     07/31/2017 01:23 PM    NONHDLC 173 (H) 07/31/2017 01:23 PM       Lab Results   Component Value Date/Time    A1C 7.5 (A) 05/17/2019 10:02 AM            cetirizine 10 MG Oral Tab, Take 10 mg by mouth daily.   ATORVASTATIN 20 MG Oral Tab, seasonal.   He  reports that he has never smoked. He has never used smokeless tobacco. He reports that he does not drink alcohol or use drugs. Exercise: once per week.   Diet: watches minimally    REVIEW OF SYSTEMS:   A comprehensive 14 point review of sy no S3, no S4 and no friction rub. No murmur heard. Pulses:       Dorsalis pedis pulses are 2+ on the right side and 2+ on the left side. Posterior tibial pulses are 2+ on the right side and 2+ on the left side. Edema not present. Carotid bruit 9 Years & >, IM 12/07/2015   • FLULAVAL 6 months & older 0.5 ml Prefilled syringe ([id number]) 10/08/2018, 11/22/2019   • Influenza 10/01/2003, 12/01/2005, 10/01/2007, 10/01/2009, 10/07/2013   • TDAP 11/22/2019      Annual Physical due on 03[birth year]10/1978  Pneumococc Return in about 3 months (around 2/22/2020).     Brenton Turk MD, 11/22/2019, 9:04 AM

## 2019-11-22 NOTE — ASSESSMENT & PLAN NOTE
Stable, Continue present management.     Blood Pressure and Cardiac Medications          QUINAPRIL HCL 10 MG Oral Tab    DILTIAZEM HCL ER COATED BEADS 180 MG Oral Tablet 24 Hr

## 2019-11-22 NOTE — ASSESSMENT & PLAN NOTE
A1c 9%, going through divorce, and poor eating.  Increase to metformin 2000 ER daily, and add ozempic

## 2019-12-02 ENCOUNTER — TELEPHONE (OUTPATIENT)
Dept: FAMILY MEDICINE CLINIC | Facility: CLINIC | Age: 44
End: 2019-12-02

## 2019-12-02 NOTE — TELEPHONE ENCOUNTER
Verified dose with Dr. Reid Ortega.  Called script into [facility] and they will process script  Ozempic 0.25mg/0.5mg - 0.5mg weekly

## 2019-12-02 NOTE — TELEPHONE ENCOUNTER
[facility] verifying dose of Ozempic. There are 2 different doses on script. Please verify and send new [facility].

## 2019-12-02 NOTE — TELEPHONE ENCOUNTER
Please clarify this more, the order I have looks like it is appropriate saying 1/2 mg weekly. Is this an issue with the actual order herbal item? If it is, then we need to put a ticket in with [employer] to look at this order.   It looks like the actual o

## 2019-12-29 DIAGNOSIS — F41.1 ANXIETY STATE: ICD-10-CM

## 2019-12-30 DIAGNOSIS — I10 HYPERTENSION GOAL BP (BLOOD PRESSURE) < 140/90: ICD-10-CM

## 2019-12-30 RX ORDER — CLONAZEPAM 2 MG/1
TABLET ORAL
Qty: 180 TABLET | Refills: 0 | Status: SHIPPED | OUTPATIENT
Start: 2019-12-30 | End: 2020-09-18

## 2019-12-30 RX ORDER — DILTIAZEM HYDROCHLORIDE EXTENDED-RELEASE TABLETS 180 MG/1
TABLET, EXTENDED RELEASE ORAL
Qty: 90 TABLET | Refills: 4 | Status: SHIPPED | OUTPATIENT
Start: 2019-12-30 | End: 2021-04-23

## 2019-12-30 NOTE — TELEPHONE ENCOUNTER
Requested Prescriptions     Pending Prescriptions Disp Refills   • DILTIAZEM HCL ER COATED BEADS 180 MG Oral Tablet 24 Hr [Pharmacy Med Name: DILTIAZEM ER(CARDIZEM LA)TABS 180MG] 90 tablet 4     Sig: TAKE 1 TABLET DAILY     LOV 11/22/2019     Patient was a

## 2019-12-30 NOTE — TELEPHONE ENCOUNTER
Pt lov 11/22/19, upcoming visit on 2/21/20. Last labs on 11/18/19. Is it ok to refill clonazepam? Please advise.  Routed to Dr Yumiko Darling

## 2020-02-18 LAB
AMB EXT CREATININE: 0.92 MG/DL
AMB EXT GFR: 101
AMB EXT GLUCOSE: 142 MG/DL
AMB EXT HGBA1C: 7 %

## 2020-02-19 ENCOUNTER — TELEPHONE (OUTPATIENT)
Dept: FAMILY MEDICINE CLINIC | Facility: CLINIC | Age: 45
End: 2020-02-19

## 2020-02-19 NOTE — TELEPHONE ENCOUNTER
Received labs from [facility] for the patient in Dr. Marii Juarez' in box for review.

## 2020-02-19 NOTE — TELEPHONE ENCOUNTER
In my abstract then, please abstract but also let him know.   Next appointment is the 21st, A1c was 7.0

## 2020-02-21 ENCOUNTER — OFFICE VISIT (OUTPATIENT)
Dept: FAMILY MEDICINE CLINIC | Facility: CLINIC | Age: 45
End: 2020-02-21
Payer: COMMERCIAL

## 2020-02-21 VITALS
SYSTOLIC BLOOD PRESSURE: 110 MMHG | TEMPERATURE: 97 F | DIASTOLIC BLOOD PRESSURE: 62 MMHG | WEIGHT: 191 LBS | BODY MASS INDEX: 25.31 KG/M2 | HEIGHT: 73 IN | RESPIRATION RATE: 16 BRPM | HEART RATE: 80 BPM

## 2020-02-21 DIAGNOSIS — J45.40 MODERATE PERSISTENT ASTHMA WITHOUT COMPLICATION: ICD-10-CM

## 2020-02-21 DIAGNOSIS — E78.2 MIXED HYPERLIPIDEMIA: ICD-10-CM

## 2020-02-21 DIAGNOSIS — I10 ESSENTIAL HYPERTENSION: Chronic | ICD-10-CM

## 2020-02-21 DIAGNOSIS — E11.65 TYPE 2 DIABETES MELLITUS WITH HYPERGLYCEMIA, WITHOUT LONG-TERM CURRENT USE OF INSULIN (HCC): Primary | ICD-10-CM

## 2020-02-21 DIAGNOSIS — F41.1 ANXIETY STATE: ICD-10-CM

## 2020-02-21 PROCEDURE — 99214 OFFICE O/P EST MOD 30 MIN: CPT | Performed by: FAMILY MEDICINE

## 2020-02-21 NOTE — ASSESSMENT & PLAN NOTE
Belinda I reviewed Asthma Control Test Score and reviewed the updated Asthma Action Plan.  ACT score 24

## 2020-02-21 NOTE — PROGRESS NOTES
HPI:   Patient presents with:  Diabetes: medication f/u     Mary Shea is a 40year old male who presents for recheck of his diabetes.   Subjective    doig a lot better, A1c down from 9.4, some back pain,   Lab Results   Component Value Date    A1C 7.0 02/ the right side and 2+ on the left side. Posterior tibial pulses are 2+ on the right side and 2+ on the left side. Edema not present. Pulmonary/Chest: Effort normal and breath sounds normal. No respiratory distress. He has no wheezes.    Abdominal the updated Asthma Action Plan.  ACT score 24            Endocrine    Type 2 diabetes mellitus ([facility] [address] - Primary    Overview     Dx 2015, A1c up to 9.0 7/13/2016 so metformin  2 daily  Increased to 2000 ER and ozempic 11/22/2019          Current Assessm

## 2020-02-21 NOTE — ASSESSMENT & PLAN NOTE
As for his Diabetes, it is reasonably well controlled, no significant medication side effects noted. Recommendations are: continue present meds, lose weight by increased dietary compliance and exercise and will check labs as ordered.     Lab Results   C

## 2020-04-28 ENCOUNTER — PATIENT MESSAGE (OUTPATIENT)
Dept: FAMILY MEDICINE CLINIC | Facility: CLINIC | Age: 45
End: 2020-04-28

## 2020-04-28 NOTE — TELEPHONE ENCOUNTER
From: Maryan Goode  To: Santino Simmons MD  Sent: 4/28/2020 8:44 AM CDT  Subject: Prescription Question    Hello,    I take Ozembic weekly. I ordered from [facility] more than 8 days ago. I was due to take the injection on Sunday but it still hasn't arrived. When it does, should I immediately take a dose and then move my weekly to that day? Or should I wait until Sunday to stay on same schedule. Seems the COVID-19 has disrupted [facility].     Paul Mcdonnell

## 2020-07-01 ENCOUNTER — TELEPHONE (OUTPATIENT)
Dept: FAMILY MEDICINE CLINIC | Facility: CLINIC | Age: 45
End: 2020-07-01

## 2020-07-01 NOTE — TELEPHONE ENCOUNTER
Received fax from [email or web address], prior authorization required for Proair HFA Oral INH. Called patient and he states he ordered this through [email or web address] by mistake, orders from [address], does not want us to proceed with prior authorization. Fax shredded.

## 2020-07-08 ENCOUNTER — PATIENT MESSAGE (OUTPATIENT)
Dept: FAMILY MEDICINE CLINIC | Facility: CLINIC | Age: 45
End: 2020-07-08

## 2020-07-08 NOTE — TELEPHONE ENCOUNTER
From: Cheyanne Durham  To: Brian Hanson MD  Sent: 7/8/2020 4:14 PM CDT  Subject: Non-Urgent Medical Question    Hello,    My Ozempic arrived 2 days ago and it was as if it had been sitting in a delivery truck that was hot. The ice packs were melted and I would describe the medicine as warm. I put them in the refrigerator right away. Are these okay to use?     Thanks,  Mayda Crump

## 2020-07-10 DIAGNOSIS — Z91.09 ENVIRONMENTAL ALLERGIES: Primary | ICD-10-CM

## 2020-07-10 DIAGNOSIS — L40.9 PSORIASIS: ICD-10-CM

## 2020-07-22 DIAGNOSIS — I10 ESSENTIAL HYPERTENSION WITH GOAL BLOOD PRESSURE LESS THAN 140/90: ICD-10-CM

## 2020-07-23 RX ORDER — QUINAPRIL 10 MG/1
TABLET ORAL
Qty: 90 TABLET | Refills: 0 | Status: SHIPPED | OUTPATIENT
Start: 2020-07-23 | End: 2020-10-21

## 2020-07-23 NOTE — TELEPHONE ENCOUNTER
Requested Prescriptions     Signed Prescriptions Disp Refills   • QUINAPRIL HCL 10 MG Oral Tab 90 tablet 0     Sig: TAKE 1 TABLET DAILY     Authorizing Provider: Oksana Lloyd     Ordering User: Kenna Duval 2/21/2020         Appointment sche

## 2020-08-12 DIAGNOSIS — J45.40 MODERATE PERSISTENT ASTHMA WITHOUT COMPLICATION: ICD-10-CM

## 2020-08-12 DIAGNOSIS — Z91.09 ENVIRONMENTAL ALLERGIES: ICD-10-CM

## 2020-08-12 NOTE — TELEPHONE ENCOUNTER
Refill request for:    Requested Prescriptions     Pending Prescriptions Disp Refills   • ALBUTEROL SULFATE  (90 Base) MCG/ACT Inhalation Aero Soln [Pharmacy Med Name: ALBUTEROL HFA INHALER 8.5GM 90MCG] 25.5 g 3     Sig: USE 2 INHALATIONS EVERY 6 HO

## 2020-08-13 RX ORDER — ALBUTEROL SULFATE 90 UG/1
AEROSOL, METERED RESPIRATORY (INHALATION)
Qty: 25.5 G | Refills: 3 | Status: SHIPPED | OUTPATIENT
Start: 2020-08-13 | End: 2021-06-01

## 2020-08-19 DIAGNOSIS — F41.1 ANXIETY STATE: ICD-10-CM

## 2020-08-20 RX ORDER — CITALOPRAM 40 MG/1
TABLET ORAL
Qty: 90 TABLET | Refills: 3 | Status: SHIPPED | OUTPATIENT
Start: 2020-08-20 | End: 2021-03-03

## 2020-08-20 NOTE — TELEPHONE ENCOUNTER
Refill request for:    Requested Prescriptions     Pending Prescriptions Disp Refills   • CITALOPRAM HYDROBROMIDE 40 MG Oral Tab [Pharmacy Med Name: CITALOPRAM HBR TABS 40MG] 90 tablet 3     Sig: TAKE 1 TABLET DAILY        Last Prescribed Quantity Refills

## 2020-09-18 ENCOUNTER — OFFICE VISIT (OUTPATIENT)
Dept: FAMILY MEDICINE CLINIC | Facility: CLINIC | Age: 45
End: 2020-09-18
Payer: COMMERCIAL

## 2020-09-18 VITALS
BODY MASS INDEX: 22.84 KG/M2 | HEIGHT: 73 IN | SYSTOLIC BLOOD PRESSURE: 124 MMHG | TEMPERATURE: 98 F | DIASTOLIC BLOOD PRESSURE: 70 MMHG | WEIGHT: 172.31 LBS | RESPIRATION RATE: 18 BRPM

## 2020-09-18 DIAGNOSIS — Z00.00 LABORATORY EXAMINATION ORDERED AS PART OF A ROUTINE GENERAL MEDICAL EXAMINATION: ICD-10-CM

## 2020-09-18 DIAGNOSIS — Z51.81 MEDICATION MONITORING ENCOUNTER: ICD-10-CM

## 2020-09-18 DIAGNOSIS — E11.65 TYPE 2 DIABETES MELLITUS WITH HYPERGLYCEMIA, WITHOUT LONG-TERM CURRENT USE OF INSULIN (HCC): Primary | ICD-10-CM

## 2020-09-18 DIAGNOSIS — E78.2 MIXED HYPERLIPIDEMIA: ICD-10-CM

## 2020-09-18 DIAGNOSIS — F41.1 ANXIETY STATE: ICD-10-CM

## 2020-09-18 DIAGNOSIS — I10 ESSENTIAL HYPERTENSION: Chronic | ICD-10-CM

## 2020-09-18 DIAGNOSIS — J45.40 MODERATE PERSISTENT ASTHMA WITHOUT COMPLICATION: ICD-10-CM

## 2020-09-18 LAB
CARTRIDGE LOT#: 671 NUMERIC
HEMOGLOBIN A1C: 5.4 % (ref 4.3–5.6)

## 2020-09-18 PROCEDURE — 99214 OFFICE O/P EST MOD 30 MIN: CPT | Performed by: FAMILY MEDICINE

## 2020-09-18 PROCEDURE — 90471 IMMUNIZATION ADMIN: CPT | Performed by: FAMILY MEDICINE

## 2020-09-18 PROCEDURE — 3008F BODY MASS INDEX DOCD: CPT | Performed by: FAMILY MEDICINE

## 2020-09-18 PROCEDURE — 90686 IIV4 VACC NO PRSV 0.5 ML IM: CPT | Performed by: FAMILY MEDICINE

## 2020-09-18 PROCEDURE — 3074F SYST BP LT 130 MM HG: CPT | Performed by: FAMILY MEDICINE

## 2020-09-18 PROCEDURE — 83036 HEMOGLOBIN GLYCOSYLATED A1C: CPT | Performed by: FAMILY MEDICINE

## 2020-09-18 PROCEDURE — 3078F DIAST BP <80 MM HG: CPT | Performed by: FAMILY MEDICINE

## 2020-09-18 RX ORDER — CLONAZEPAM 2 MG/1
2 TABLET ORAL 2 TIMES DAILY PRN
Qty: 180 TABLET | Refills: 1 | Status: SHIPPED | OUTPATIENT
Start: 2020-09-18 | End: 2021-03-04

## 2020-09-18 RX ORDER — METFORMIN HYDROCHLORIDE 500 MG/1
500 TABLET, EXTENDED RELEASE ORAL 2 TIMES DAILY WITH MEALS
Qty: 360 TABLET | Refills: 3 | COMMUNITY
Start: 2020-09-18 | End: 2020-12-29

## 2020-09-18 NOTE — PATIENT INSTRUCTIONS
Mole skin, in a donut around the ball of both feet. Treating Metatarsalgia    Metatarsalgia is pain in the \"ball of your foot,\" the area between your arch and your toes.  The pain usually starts in one or more of the five bones in this area under the to shoes with thin soles and high heels. This puts extra pressure on the bones in the ball of the foot. Standing or walking on a hard surface for long periods also puts added pressure on the bones, causing pain.  The pain can occur under any of the five metata

## 2020-09-18 NOTE — PROGRESS NOTES
HPI:   Patient presents with:  Diabetes: follow up. Luis Daniel Walker is a 39year old male who presents for recheck of his diabetes. Subjective    Much better diet, swelling on bottom of feet. ,   Last A1c value was 5.4% done 9/18/2020.      Pneumococcal PP normal, S2 normal and normal heart sounds. No murmur heard. Pulses:       Posterior tibial pulses are 2+ on the right side and 2+ on the left side. Edema not present. Pulmonary/Chest: Effort normal and breath sounds normal. No respiratory distress. lose weight by increased dietary compliance and exercise and will check labs as ordered.     Lab Results   Component Value Date    A1C 5.4 09/18/2020    A1C 7.0 02/18/2020      Blood Sugar Medications          Semaglutide, 1 MG/DOSE, (OZEMPIC, 1 MG/DOSE,) 2

## 2020-09-22 DIAGNOSIS — E78.5 HYPERLIPIDEMIA WITH TARGET LDL LESS THAN 130: ICD-10-CM

## 2020-09-24 RX ORDER — ATORVASTATIN CALCIUM 20 MG/1
TABLET, FILM COATED ORAL
Qty: 90 TABLET | Refills: 3 | Status: SHIPPED | OUTPATIENT
Start: 2020-09-24 | End: 2021-08-12

## 2020-09-24 NOTE — TELEPHONE ENCOUNTER
ATORVASTATIN TABS 20MG     Sig: TAKE 1 TABLET DAILY    Disp:  90 tablet    Refills:  3    Cholesterol Medication Protocol [id number]/22/2020 11:59 PM   ALT < 80 Protocol Details    ALT resulted within past year

## 2020-09-24 NOTE — TELEPHONE ENCOUNTER
Labs ordered 9/18/20 not completed  LOV 9/18-20 with Dr Rosales Congress  Mixed hyperlipidemia      Overview       Atorvastatin 20           Current Assessment & Plan       Stable, Continue present management.         Cholesterol Lowering Medications

## 2020-10-20 DIAGNOSIS — I10 ESSENTIAL HYPERTENSION WITH GOAL BLOOD PRESSURE LESS THAN 140/90: ICD-10-CM

## 2020-10-21 RX ORDER — QUINAPRIL 10 MG/1
TABLET ORAL
Qty: 90 TABLET | Refills: 0 | Status: SHIPPED | OUTPATIENT
Start: 2020-10-21 | End: 2021-04-23

## 2020-10-21 NOTE — TELEPHONE ENCOUNTER
Requested Prescriptions     Pending Prescriptions Disp Refills   • QUINAPRIL HCL 10 MG Oral Tab [Pharmacy Med Name: QUINAPRIL TABS 10MG] 90 tablet 3     Sig: TAKE 1 TABLET DAILY     LOV 9/18/2020         Appointment scheduled: 3/18/2021 Anjel Caro MD

## 2020-10-22 ENCOUNTER — TELEPHONE (OUTPATIENT)
Dept: FAMILY MEDICINE CLINIC | Facility: CLINIC | Age: 45
End: 2020-10-22

## 2020-10-22 NOTE — TELEPHONE ENCOUNTER
AMOL for pt, Joyce Chaney [facility]  is calling patient reported to her that pt has some sores on his feet and unable to walk. She wants us to reach out to the patient.

## 2020-12-27 DIAGNOSIS — E11.65 TYPE 2 DIABETES MELLITUS WITH HYPERGLYCEMIA, WITHOUT LONG-TERM CURRENT USE OF INSULIN (HCC): ICD-10-CM

## 2020-12-29 RX ORDER — METFORMIN HYDROCHLORIDE 500 MG/1
TABLET, EXTENDED RELEASE ORAL
Qty: 360 TABLET | Refills: 3 | Status: SHIPPED | OUTPATIENT
Start: 2020-12-29

## 2020-12-30 NOTE — TELEPHONE ENCOUNTER
Requested Prescriptions     Pending Prescriptions Disp Refills   • METFORMIN HCL  MG Oral Tablet 24 Hr [Pharmacy Med Name: METFORMIN HCL ER TABS 500MG] 360 tablet 3     Sig: TAKE 2 TABLETS TWICE A DAY WITH MEALS     LOV 9/18/2020     Patient was aske

## 2021-03-03 DIAGNOSIS — F41.1 ANXIETY STATE: ICD-10-CM

## 2021-03-04 DIAGNOSIS — F41.1 ANXIETY STATE: ICD-10-CM

## 2021-03-05 RX ORDER — CITALOPRAM 40 MG/1
40 TABLET ORAL DAILY
Qty: 90 TABLET | Refills: 3 | Status: SHIPPED | OUTPATIENT
Start: 2021-03-05 | End: 2021-10-14

## 2021-03-05 RX ORDER — CLONAZEPAM 2 MG/1
2 TABLET ORAL 2 TIMES DAILY PRN
Qty: 180 TABLET | Refills: 1 | Status: SHIPPED | OUTPATIENT
Start: 2021-03-05 | End: 2022-01-19

## 2021-03-05 NOTE — TELEPHONE ENCOUNTER
Refill request for:    Requested Prescriptions     Pending Prescriptions Disp Refills   • clonazePAM 2 MG Oral Tab 180 tablet 1     Sig: Take 1 tablet (2 mg total) by mouth 2 (two) times daily as needed for Anxiety.         Last Prescribed Quantity Refills

## 2021-03-05 NOTE — TELEPHONE ENCOUNTER
Refill request for:    Requested Prescriptions     Pending Prescriptions Disp Refills   • Citalopram Hydrobromide 40 MG Oral Tab 90 tablet 3     Sig: Take 1 tablet (40 mg total) by mouth daily.         Last Prescribed Quantity Refills   8/20/2020 90 3     L

## 2021-03-22 ENCOUNTER — PATIENT MESSAGE (OUTPATIENT)
Dept: FAMILY MEDICINE CLINIC | Facility: CLINIC | Age: 46
End: 2021-03-22

## 2021-03-22 DIAGNOSIS — Z00.00 BLOOD TESTS FOR ROUTINE GENERAL PHYSICAL EXAMINATION: ICD-10-CM

## 2021-03-22 DIAGNOSIS — E11.65 TYPE 2 DIABETES MELLITUS WITH HYPERGLYCEMIA, WITHOUT LONG-TERM CURRENT USE OF INSULIN (HCC): Primary | ICD-10-CM

## 2021-03-22 DIAGNOSIS — Z51.81 MEDICATION MONITORING ENCOUNTER: ICD-10-CM

## 2021-03-22 DIAGNOSIS — E78.2 MIXED HYPERLIPIDEMIA: ICD-10-CM

## 2021-03-22 DIAGNOSIS — I10 ESSENTIAL HYPERTENSION: ICD-10-CM

## 2021-03-22 NOTE — TELEPHONE ENCOUNTER
From: Oksana Ryan  To: Bhaskar Zee MD  Sent: 3/22/2021 10:18 AM CDT  Subject: Test Results Question    Hello. Can you confirm that [facility] has my blood test order?  Also, is it possible to add Testosterone testing to the test?

## 2021-03-22 NOTE — TELEPHONE ENCOUNTER
Reissued lab orders at [address] as requested by pt for A1C, Microalbumin,lipid,CMP,CBC and Tegretol level. Pt aware.

## 2021-03-29 ENCOUNTER — PATIENT MESSAGE (OUTPATIENT)
Dept: FAMILY MEDICINE CLINIC | Facility: CLINIC | Age: 46
End: 2021-03-29

## 2021-04-23 DIAGNOSIS — I10 ESSENTIAL HYPERTENSION WITH GOAL BLOOD PRESSURE LESS THAN 140/90: ICD-10-CM

## 2021-04-23 DIAGNOSIS — I10 HYPERTENSION GOAL BP (BLOOD PRESSURE) < 140/90: ICD-10-CM

## 2021-04-23 RX ORDER — SEMAGLUTIDE 1.34 MG/ML
0.5 INJECTION, SOLUTION SUBCUTANEOUS WEEKLY
Qty: 6 PEN | Refills: 3 | Status: CANCELLED | OUTPATIENT
Start: 2021-04-23

## 2021-04-26 RX ORDER — SEMAGLUTIDE 1.34 MG/ML
0.5 INJECTION, SOLUTION SUBCUTANEOUS WEEKLY
Qty: 6 PEN | Refills: 3 | Status: CANCELLED | OUTPATIENT
Start: 2021-04-26

## 2021-04-27 ENCOUNTER — PATIENT MESSAGE (OUTPATIENT)
Dept: FAMILY MEDICINE CLINIC | Facility: CLINIC | Age: 46
End: 2021-04-27

## 2021-04-27 ENCOUNTER — TELEPHONE (OUTPATIENT)
Dept: FAMILY MEDICINE CLINIC | Facility: CLINIC | Age: 46
End: 2021-04-27

## 2021-04-27 RX ORDER — SEMAGLUTIDE 1.34 MG/ML
0.5 INJECTION, SOLUTION SUBCUTANEOUS WEEKLY
Qty: 6 PEN | Refills: 3 | Status: SHIPPED | OUTPATIENT
Start: 2021-04-27 | End: 2021-05-05

## 2021-04-27 NOTE — TELEPHONE ENCOUNTER
From: Zoraida Mohan  To: Silas Barrow MD  Sent: 4/27/2021 8:16 AM CDT  Subject: Prescription Question    Hello,    I have requested refills of Ozembic but it does not appear to be going through.  I put in the request through the web site but do not see any so

## 2021-04-27 NOTE — TELEPHONE ENCOUNTER
Received fax from [employer] requesting a Prior Authorization on Ozempic 2 mg/1.5ml pen injectors.   Pt informed of process    KEY:[id number]    Form in Pat's inbox

## 2021-04-28 ENCOUNTER — TELEPHONE (OUTPATIENT)
Dept: FAMILY MEDICINE CLINIC | Facility: CLINIC | Age: 46
End: 2021-04-28

## 2021-04-28 RX ORDER — DILTIAZEM HYDROCHLORIDE EXTENDED-RELEASE TABLETS 180 MG/1
180 TABLET, EXTENDED RELEASE ORAL DAILY
Qty: 90 TABLET | Refills: 3 | Status: SHIPPED | OUTPATIENT
Start: 2021-04-28 | End: 2021-09-09 | Stop reason: ALTCHOICE

## 2021-04-28 RX ORDER — QUINAPRIL 10 MG/1
10 TABLET ORAL DAILY
Qty: 90 TABLET | Refills: 3 | Status: SHIPPED | OUTPATIENT
Start: 2021-04-28

## 2021-04-28 NOTE — TELEPHONE ENCOUNTER
Received fax from [facility] requiring a Prior Authorization on med Matzim LA I 80 mg tabs. Pt not sure what medication this is?  Form in Pat's inbox    BEAVER:[id number]

## 2021-04-28 NOTE — TELEPHONE ENCOUNTER
Requested Prescriptions     Pending Prescriptions Disp Refills   • dilTIAZem HCl ER Coated Beads 180 MG Oral Tablet 24 Hr 90 tablet 4     Sig: Take 1 tablet (180 mg total) by mouth daily.    • Quinapril HCl 10 MG Oral Tab 90 tablet 0     Sig: Take 1 tablet

## 2021-04-29 RX ORDER — SEMAGLUTIDE 1.34 MG/ML
INJECTION, SOLUTION SUBCUTANEOUS
COMMUNITY
Start: 2020-11-04 | End: 2021-04-29 | Stop reason: DRUGHIGH

## 2021-04-29 NOTE — TELEPHONE ENCOUNTER
Processed through [address]: [id number] – [place] Case ID: [id number] – Rx #: [id number]    [facility] be changed to diltiazem ER, answered yes because that is exactly what we sent in the scrip for  Not dulce maria Matzem as [facility] pharmacy is request

## 2021-04-29 NOTE — TELEPHONE ENCOUNTER
Completed all info in [facility] and sent to insurance  Padilla Murphy (Garrett: [id number])  Rx #: [id number]

## 2021-04-29 NOTE — TELEPHONE ENCOUNTER
Coverage for brand Matzem has been denied because we indicated that generic Diltiazem ER is a valid alternative, which we originally ordered   Please advise patient to contact his pharmacy and move forward with filling the generic diltiazem

## 2021-05-05 ENCOUNTER — TELEPHONE (OUTPATIENT)
Dept: FAMILY MEDICINE CLINIC | Facility: CLINIC | Age: 46
End: 2021-05-05

## 2021-05-05 RX ORDER — SEMAGLUTIDE 1.34 MG/ML
0.5 INJECTION, SOLUTION SUBCUTANEOUS WEEKLY
Qty: 6 PEN | Refills: 3 | Status: SHIPPED | OUTPATIENT
Start: 2021-05-05 | End: 2021-05-10

## 2021-05-05 NOTE — TELEPHONE ENCOUNTER
Please notify:    Requested Prescriptions     Signed Prescriptions Disp Refills   • Semaglutide, 1 MG/DOSE, (OZEMPIC, 1 MG/DOSE,) 2 MG/1.5ML Subcutaneous Solution Pen-injector 6 pen 3     Sig: Inject 0.5 mL into the skin once a week.      Authorizing Provid

## 2021-05-05 NOTE — TELEPHONE ENCOUNTER
Pharmacist states the directions on the script does not match the dose. They need a new script with 0.25/0.5 ml in order to be able to administer 0.5ml as directed. He would not be able to do so with 1 ml ordered.

## 2021-05-10 ENCOUNTER — PATIENT MESSAGE (OUTPATIENT)
Dept: FAMILY MEDICINE CLINIC | Facility: CLINIC | Age: 46
End: 2021-05-10

## 2021-05-10 DIAGNOSIS — E11.65 TYPE 2 DIABETES MELLITUS WITH HYPERGLYCEMIA, WITHOUT LONG-TERM CURRENT USE OF INSULIN (HCC): Primary | ICD-10-CM

## 2021-05-10 RX ORDER — SEMAGLUTIDE 1.34 MG/ML
0.5 INJECTION, SOLUTION SUBCUTANEOUS WEEKLY
Qty: 4 PEN | Refills: 3 | Status: SHIPPED | OUTPATIENT
Start: 2021-05-10

## 2021-05-10 NOTE — TELEPHONE ENCOUNTER
From: Armando Cordero  To: Ame Juarez MD  Sent: 5/10/2021 8:19 AM CDT  Subject: Prescription Question    Hi,    We've been communicating about getting Ozembic generic refilled for over a week.  [facility] to [facility] and they said the prescription sent was for 1mg b

## 2021-05-10 NOTE — TELEPHONE ENCOUNTER
1. Type 2 diabetes mellitus with hyperglycemia, without long-term current use of insulin ([facility] [address] (Primary)  Overview:  Dx 2015, A1c up to 9.0 7/13/2016. metformin  2 daily   and ozempic 11/22/2019   Orders:  -     Ozempic (0.25 or 0.5 MG/DOSE);  Inject

## 2021-05-10 NOTE — TELEPHONE ENCOUNTER
Spoke with [facility] pharmacist.    If you want pt's Ozempic dosage to be 0.5 ml weekly, you have to prescribe Ozempic 0.25/0.5 mg pen. The 1mg pen dose not have the ability to dose 0.5 ml. Routed to Dr Lei Allan.

## 2021-06-01 DIAGNOSIS — J45.40 MODERATE PERSISTENT ASTHMA WITHOUT COMPLICATION: ICD-10-CM

## 2021-06-01 DIAGNOSIS — Z91.09 ENVIRONMENTAL ALLERGIES: ICD-10-CM

## 2021-06-08 RX ORDER — ALBUTEROL SULFATE 90 UG/1
2 AEROSOL, METERED RESPIRATORY (INHALATION) EVERY 6 HOURS PRN
Qty: 25.5 G | Refills: 3 | Status: SHIPPED | OUTPATIENT
Start: 2021-06-08

## 2021-06-08 NOTE — TELEPHONE ENCOUNTER
Requested Prescriptions     Pending Prescriptions Disp Refills   • Albuterol Sulfate  (90 Base) MCG/ACT Inhalation Aero Soln 25.5 g 3     Sig: Inhale 2 puffs into the lungs every 6 (six) hours as needed for Wheezing.      LOV 9/18/2020     Patient wa

## 2021-06-08 NOTE — TELEPHONE ENCOUNTER
Medication refilled per protocol.      Requested Prescriptions     Signed Prescriptions Disp Refills   • Albuterol Sulfate  (90 Base) MCG/ACT Inhalation Aero Soln 25.5 g 3     Sig: Inhale 2 puffs into the lungs every 6 (six) hours as needed for [place]

## 2021-06-29 DIAGNOSIS — F41.1 ANXIETY STATE: ICD-10-CM

## 2021-07-02 NOTE — TELEPHONE ENCOUNTER
Refill request for:    Requested Prescriptions     Pending Prescriptions Disp Refills   • CITALOPRAM HYDROBROMIDE 40 MG Oral Tab [Pharmacy Med Name: CITALOPRAM TAB 40MG] 90 tablet 3     Sig: TAKE 1 TABLET DAILY        Last Prescribed Quantity Refills   3/5

## 2021-07-06 RX ORDER — CITALOPRAM 40 MG/1
TABLET ORAL
Qty: 90 TABLET | Refills: 3 | OUTPATIENT
Start: 2021-07-06

## 2021-08-05 DIAGNOSIS — E78.5 HYPERLIPIDEMIA WITH TARGET LDL LESS THAN 130: ICD-10-CM

## 2021-08-12 RX ORDER — ATORVASTATIN CALCIUM 20 MG/1
TABLET, FILM COATED ORAL
Qty: 90 TABLET | Refills: 0 | Status: SHIPPED | OUTPATIENT
Start: 2021-08-12 | End: 2021-11-02

## 2021-08-12 NOTE — TELEPHONE ENCOUNTER
ATORVASTATIN TAB 20MG     Sig: TAKE 1 TABLET DAILY    Disp:  90 tablet    Refills:  3    Start: 8/5/2021    Class: Normal    Non-formulary For: Hyperlipidemia with target LDL less than 130    Last ordered: 10 months ago by Ashwin Mae MD Last refill: 5/16

## 2021-08-12 NOTE — TELEPHONE ENCOUNTER
LOV 08/18/2020 diabetes w/Deepali  Last lab 9/18.2020  No upcoming OV scheduled  Dr Salma Lopes requested Return in about 6 months (around 3/18/2021) for diabetes follow up.

## 2021-08-18 NOTE — TELEPHONE ENCOUNTER
Left message notifying patient that his medication is refilled. And he does need to call us back to schedule a appointment.

## 2021-08-23 ENCOUNTER — PATIENT MESSAGE (OUTPATIENT)
Dept: FAMILY MEDICINE CLINIC | Facility: CLINIC | Age: 46
End: 2021-08-23

## 2021-08-23 NOTE — TELEPHONE ENCOUNTER
From: Natacha Jain  To: Argelia Valdez MD  Sent: 8/23/2021 9:55 AM CDT  Subject: Test Results Question    Hello,    I have a visit with Dr. Jesus Thurman on 9/9. Can you confirm my [facility] bloodwork order is still valid? Thanks!   Troy Mccarthy

## 2021-09-07 PROCEDURE — 3061F NEG MICROALBUMINURIA REV: CPT | Performed by: FAMILY MEDICINE

## 2021-09-07 PROCEDURE — 3044F HG A1C LEVEL LT 7.0%: CPT | Performed by: FAMILY MEDICINE

## 2021-09-08 LAB
ABSOLUTE BASOPHILS: 91 CELLS/UL (ref 0–200)
ABSOLUTE EOSINOPHILS: 280 CELLS/UL (ref 15–500)
ABSOLUTE LYMPHOCYTES: 2352 CELLS/UL (ref 850–3900)
ABSOLUTE MONOCYTES: 413 CELLS/UL (ref 200–950)
ABSOLUTE NEUTROPHILS: 3864 CELLS/UL (ref 1500–7800)
ALBUMIN/GLOBULIN RATIO: 2.2 (CALC) (ref 1–2.5)
ALBUMIN: 4.9 G/DL (ref 3.6–5.1)
ALKALINE PHOSPHATASE: 45 U/L (ref 36–130)
ALT: 27 U/L (ref 9–46)
AST: 19 U/L (ref 10–40)
BASOPHILS: 1.3 %
BILIRUBIN, TOTAL: 0.4 MG/DL (ref 0.2–1.2)
BUN: 9 MG/DL (ref 7–25)
CALCIUM: 9.9 MG/DL (ref 8.6–10.3)
CARBAMAZEPINE, TOTAL: <2 MG/L (ref 4–12)
CARBON DIOXIDE: 26 MMOL/L (ref 20–32)
CHLORIDE: 105 MMOL/L (ref 98–110)
CHOL/HDLC RATIO: 2.8 (CALC)
CHOLESTEROL, TOTAL: 166 MG/DL
CREATININE: 0.83 MG/DL (ref 0.6–1.35)
EGFR IF AFRICN AM: 122 ML/MIN/1.73M2
EGFR IF NONAFRICN AM: 106 ML/MIN/1.73M2
EOSINOPHILS: 4 %
GLOBULIN: 2.2 G/DL (CALC) (ref 1.9–3.7)
GLUCOSE: 109 MG/DL (ref 65–99)
HDL CHOLESTEROL: 60 MG/DL
HEMATOCRIT: 45.1 % (ref 38.5–50)
HEMOGLOBIN A1C: 6.4 % OF TOTAL HGB
HEMOGLOBIN: 15.5 G/DL (ref 13.2–17.1)
LDL-CHOLESTEROL: 77 MG/DL (CALC)
LYMPHOCYTES: 33.6 %
MCH: 31.3 PG (ref 27–33)
MCHC: 34.4 G/DL (ref 32–36)
MCV: 91.1 FL (ref 80–100)
MICROALBUMIN: 0.2 MG/DL
MONOCYTES: 5.9 %
MPV: 10.4 FL (ref 7.5–12.5)
NEUTROPHILS: 55.2 %
NON-HDL CHOLESTEROL: 106 MG/DL (CALC)
PLATELET COUNT: 251 THOUSAND/UL (ref 140–400)
POTASSIUM: 4.9 MMOL/L (ref 3.5–5.3)
PROTEIN, TOTAL: 7.1 G/DL (ref 6.1–8.1)
RDW: 13.7 % (ref 11–15)
RED BLOOD CELL COUNT: 4.95 MILLION/UL (ref 4.2–5.8)
SODIUM: 139 MMOL/L (ref 135–146)
TRIGLYCERIDES: 195 MG/DL
WHITE BLOOD CELL COUNT: 7 THOUSAND/UL (ref 3.8–10.8)

## 2021-09-09 ENCOUNTER — OFFICE VISIT (OUTPATIENT)
Dept: FAMILY MEDICINE CLINIC | Facility: CLINIC | Age: 46
End: 2021-09-09
Payer: COMMERCIAL

## 2021-09-09 VITALS
RESPIRATION RATE: 14 BRPM | HEIGHT: 74 IN | SYSTOLIC BLOOD PRESSURE: 122 MMHG | BODY MASS INDEX: 22.48 KG/M2 | HEART RATE: 72 BPM | WEIGHT: 175.19 LBS | DIASTOLIC BLOOD PRESSURE: 80 MMHG

## 2021-09-09 DIAGNOSIS — E78.2 MIXED HYPERLIPIDEMIA: ICD-10-CM

## 2021-09-09 DIAGNOSIS — I10 ESSENTIAL HYPERTENSION: Chronic | ICD-10-CM

## 2021-09-09 DIAGNOSIS — F41.1 ANXIETY STATE: ICD-10-CM

## 2021-09-09 DIAGNOSIS — E11.65 TYPE 2 DIABETES MELLITUS WITH HYPERGLYCEMIA, WITHOUT LONG-TERM CURRENT USE OF INSULIN (HCC): Primary | ICD-10-CM

## 2021-09-09 DIAGNOSIS — J45.40 MODERATE PERSISTENT ASTHMA WITHOUT COMPLICATION: ICD-10-CM

## 2021-09-09 PROCEDURE — 3074F SYST BP LT 130 MM HG: CPT | Performed by: FAMILY MEDICINE

## 2021-09-09 PROCEDURE — 3079F DIAST BP 80-89 MM HG: CPT | Performed by: FAMILY MEDICINE

## 2021-09-09 PROCEDURE — 99214 OFFICE O/P EST MOD 30 MIN: CPT | Performed by: FAMILY MEDICINE

## 2021-09-09 PROCEDURE — 3008F BODY MASS INDEX DOCD: CPT | Performed by: FAMILY MEDICINE

## 2021-09-09 RX ORDER — BUSPIRONE HYDROCHLORIDE 10 MG/1
TABLET ORAL 3 TIMES DAILY PRN
Qty: 90 TABLET | Refills: 3 | Status: SHIPPED | OUTPATIENT
Start: 2021-09-09 | End: 2021-12-06

## 2021-09-09 NOTE — PROGRESS NOTES
had concerns including Diabetes (check up ) and Anxiety (discuss about meds ). Cecilio Isidro is a 55year old male who presents for recheck of his diabetes. HPI/Subjective:   Lost a lot of weight, doing a lot better  Last A1c value was 6.4% done 9/7/2021. Pulses:           Dorsalis pedis pulses are 2+ on the right side and 2+ on the left side. Posterior tibial pulses are 2+ on the right side and 2+ on the left side. Heart sounds: Normal heart sounds. No murmur heard.      Pulmonary:      Effort: A1C 5.4 09/18/2020      Blood Sugar Medications          Semaglutide,0.25 or 0.5MG/DOS, (OZEMPIC, 0.25 OR 0.5 MG/DOSE,) 2 MG/1.5ML Subcutaneous Solution Pen-injector    METFORMIN HCL  MG Oral Tablet 24 Hr            Orders:  -     Comp Metabolic Pane

## 2021-10-05 ENCOUNTER — PATIENT MESSAGE (OUTPATIENT)
Dept: FAMILY MEDICINE CLINIC | Facility: CLINIC | Age: 46
End: 2021-10-05

## 2021-10-05 NOTE — TELEPHONE ENCOUNTER
From: Natacha Jain  To: Argelia Valdez MD  Sent: 10/5/2021 10:10 AM CDT  Subject: COVID Vaccine Registration    Hello,    When I was at my appointment last week, Dr. Lee Many noted that my vaccine record was not automatically pulled in.  Is there a database that I

## 2021-10-11 DIAGNOSIS — F41.1 ANXIETY STATE: ICD-10-CM

## 2021-10-12 RX ORDER — CITALOPRAM 40 MG/1
TABLET ORAL
Qty: 90 TABLET | Refills: 3 | OUTPATIENT
Start: 2021-10-12

## 2021-10-12 NOTE — TELEPHONE ENCOUNTER
Refill request for:    Requested Prescriptions     Pending Prescriptions Disp Refills   • CITALOPRAM 40 MG Oral Tab [Pharmacy Med Name: CITALOPRAM TAB 40MG] 90 tablet 3     Sig: TAKE 1 TABLET DAILY      [facility]  Last Prescribed Quantity Refills

## 2021-10-13 DIAGNOSIS — F41.1 ANXIETY STATE: ICD-10-CM

## 2021-10-14 RX ORDER — CITALOPRAM 40 MG/1
TABLET ORAL
Qty: 90 TABLET | Refills: 3 | Status: SHIPPED | OUTPATIENT
Start: 2021-10-14

## 2021-10-14 NOTE — TELEPHONE ENCOUNTER
Refill request for:    Requested Prescriptions     Pending Prescriptions Disp Refills   • CITALOPRAM 40 MG Oral Tab [Pharmacy Med Name: CITALOPRAM TAB 40MG] 90 tablet 3     Sig: TAKE 1 TABLET DAILY        Last Prescribed Quantity Refills   3/5/2021 90 3

## 2021-11-01 DIAGNOSIS — E78.5 HYPERLIPIDEMIA WITH TARGET LDL LESS THAN 130: ICD-10-CM

## 2021-11-02 RX ORDER — ATORVASTATIN CALCIUM 20 MG/1
TABLET, FILM COATED ORAL
Qty: 90 TABLET | Refills: 0 | Status: SHIPPED | OUTPATIENT
Start: 2021-11-02 | End: 2022-01-15
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
## 2021-12-02 DIAGNOSIS — F41.1 ANXIETY STATE: ICD-10-CM

## 2021-12-06 RX ORDER — BUSPIRONE HYDROCHLORIDE 10 MG/1
TABLET ORAL 3 TIMES DAILY PRN
Qty: 270 TABLET | Refills: 1 | Status: SHIPPED | OUTPATIENT
Start: 2021-12-06

## 2021-12-06 NOTE — TELEPHONE ENCOUNTER
Refill request for:    Requested Prescriptions     Pending Prescriptions Disp Refills   • BUSPIRONE 10 MG Oral Tab [Pharmacy Med Name: BUSPIRONE HCL 10 MG TABLET] 270 tablet 1     Sig: TAKE 0.5-1 TABLETS (5-10 MG TOTAL) BY MOUTH 3 (THREE) TIMES DAILY AS NE

## 2021-12-07 ENCOUNTER — PATIENT MESSAGE (OUTPATIENT)
Dept: FAMILY MEDICINE CLINIC | Facility: CLINIC | Age: 46
End: 2021-12-07

## 2021-12-07 NOTE — TELEPHONE ENCOUNTER
From: Elayne Nichols  To: Macie Olivas MD  Sent: 12/7/2021 9:46 AM CST  Subject: Booster Shot Complete     Hello,    I received my Pablo Sullivan booster 11/27. The lot is [id number] and was done at [address]. Thanks!

## 2022-01-13 DIAGNOSIS — E78.5 HYPERLIPIDEMIA WITH TARGET LDL LESS THAN 130: ICD-10-CM

## 2022-01-15 RX ORDER — ATORVASTATIN CALCIUM 20 MG/1
TABLET, FILM COATED ORAL
Qty: 90 TABLET | Refills: 3 | Status: SHIPPED | OUTPATIENT
Start: 2022-01-15

## 2022-01-19 DIAGNOSIS — F41.1 ANXIETY STATE: ICD-10-CM

## 2022-01-19 RX ORDER — CLONAZEPAM 2 MG/1
2 TABLET ORAL 2 TIMES DAILY PRN
Qty: 180 TABLET | Refills: 0 | Status: SHIPPED | OUTPATIENT
Start: 2022-01-19

## 2022-01-19 NOTE — TELEPHONE ENCOUNTER
Refill request for:    Requested Prescriptions     Pending Prescriptions Disp Refills   • CLONAZEPAM 2 MG Oral Tab [Pharmacy Med Name: CLONAZEPAM 2 MG TABLET] 180 tablet 0     Sig: TAKE 1 TABLET (2 MG TOTAL) BY MOUTH 2 (TWO) TIMES DAILY AS NEEDED FOR Wilhelminia Bijou

## 2022-04-27 RX ORDER — QUINAPRIL 10 MG/1
TABLET ORAL
Qty: 90 TABLET | Refills: 1 | Status: SHIPPED | OUTPATIENT
Start: 2022-04-27

## 2022-05-03 ENCOUNTER — PATIENT OUTREACH (OUTPATIENT)
Dept: FAMILY MEDICINE CLINIC | Facility: CLINIC | Age: 47
End: 2022-05-03

## 2022-06-02 ENCOUNTER — PATIENT MESSAGE (OUTPATIENT)
Dept: FAMILY MEDICINE CLINIC | Facility: CLINIC | Age: 47
End: 2022-06-02

## 2022-06-02 DIAGNOSIS — E78.2 MIXED HYPERLIPIDEMIA: ICD-10-CM

## 2022-06-02 DIAGNOSIS — E11.65 TYPE 2 DIABETES MELLITUS WITH HYPERGLYCEMIA, WITHOUT LONG-TERM CURRENT USE OF INSULIN (HCC): Primary | ICD-10-CM

## 2022-06-02 NOTE — TELEPHONE ENCOUNTER
From: Sherie Soares  To: Gabby Shea MD  Sent: 6/2/2022 11:06 AM CDT  Subject: Blood test order    Hi,    I had to go out of town for work multiple weeks.  Can you resend blood work orders for [employer]?    Thanks!!

## 2022-06-21 PROCEDURE — 3044F HG A1C LEVEL LT 7.0%: CPT | Performed by: FAMILY MEDICINE

## 2022-06-21 PROCEDURE — 3061F NEG MICROALBUMINURIA REV: CPT | Performed by: FAMILY MEDICINE

## 2022-06-22 LAB
ALBUMIN/GLOBULIN RATIO: 2.4 (CALC) (ref 1–2.5)
ALBUMIN: 4.8 G/DL (ref 3.6–5.1)
ALKALINE PHOSPHATASE: 54 U/L (ref 36–130)
ALT: 24 U/L (ref 9–46)
AST: 19 U/L (ref 10–40)
BILIRUBIN, TOTAL: 0.9 MG/DL (ref 0.2–1.2)
BUN: 7 MG/DL (ref 7–25)
CALCIUM: 9.5 MG/DL (ref 8.6–10.3)
CARBON DIOXIDE: 25 MMOL/L (ref 20–32)
CHLORIDE: 102 MMOL/L (ref 98–110)
CHOL/HDLC RATIO: 2.6 (CALC)
CHOLESTEROL, TOTAL: 142 MG/DL
CREATININE: 0.79 MG/DL (ref 0.6–1.35)
EGFR IF AFRICN AM: 124 ML/MIN/1.73M2
EGFR IF NONAFRICN AM: 107 ML/MIN/1.73M2
GLOBULIN: 2 G/DL (CALC) (ref 1.9–3.7)
GLUCOSE: 86 MG/DL (ref 65–99)
HDL CHOLESTEROL: 55 MG/DL
HEMOGLOBIN A1C: 5.6 % OF TOTAL HGB
LDL-CHOLESTEROL: 66 MG/DL (CALC)
MICROALBUMIN: 0.2 MG/DL
NON-HDL CHOLESTEROL: 87 MG/DL (CALC)
POTASSIUM: 4.2 MMOL/L (ref 3.5–5.3)
PROTEIN, TOTAL: 6.8 G/DL (ref 6.1–8.1)
SODIUM: 137 MMOL/L (ref 135–146)
TRIGLYCERIDES: 120 MG/DL

## 2022-06-23 ENCOUNTER — OFFICE VISIT (OUTPATIENT)
Dept: FAMILY MEDICINE CLINIC | Facility: CLINIC | Age: 47
End: 2022-06-23
Payer: COMMERCIAL

## 2022-06-23 VITALS
HEIGHT: 73 IN | SYSTOLIC BLOOD PRESSURE: 132 MMHG | BODY MASS INDEX: 21.89 KG/M2 | RESPIRATION RATE: 18 BRPM | HEART RATE: 66 BPM | WEIGHT: 165.19 LBS | DIASTOLIC BLOOD PRESSURE: 70 MMHG

## 2022-06-23 DIAGNOSIS — E11.65 TYPE 2 DIABETES MELLITUS WITH HYPERGLYCEMIA, WITHOUT LONG-TERM CURRENT USE OF INSULIN (HCC): ICD-10-CM

## 2022-06-23 DIAGNOSIS — Z12.11 SCREEN FOR COLON CANCER: ICD-10-CM

## 2022-06-23 DIAGNOSIS — L40.9 PSORIASIS: ICD-10-CM

## 2022-06-23 DIAGNOSIS — E78.2 MIXED HYPERLIPIDEMIA: ICD-10-CM

## 2022-06-23 DIAGNOSIS — Z00.00 ANNUAL PHYSICAL EXAM: Primary | ICD-10-CM

## 2022-06-23 DIAGNOSIS — J45.40 MODERATE PERSISTENT ASTHMA WITHOUT COMPLICATION: ICD-10-CM

## 2022-06-23 DIAGNOSIS — I10 ESSENTIAL HYPERTENSION: Chronic | ICD-10-CM

## 2022-06-23 DIAGNOSIS — F41.1 ANXIETY STATE: ICD-10-CM

## 2022-06-23 PROCEDURE — 3008F BODY MASS INDEX DOCD: CPT | Performed by: FAMILY MEDICINE

## 2022-06-23 PROCEDURE — 3078F DIAST BP <80 MM HG: CPT | Performed by: FAMILY MEDICINE

## 2022-06-23 PROCEDURE — 99214 OFFICE O/P EST MOD 30 MIN: CPT | Performed by: FAMILY MEDICINE

## 2022-06-23 PROCEDURE — 3075F SYST BP GE 130 - 139MM HG: CPT | Performed by: FAMILY MEDICINE

## 2022-06-23 RX ORDER — SEMAGLUTIDE 1.34 MG/ML
0.5 INJECTION, SOLUTION SUBCUTANEOUS WEEKLY
Qty: 4 EACH | Refills: 3 | Status: SHIPPED | OUTPATIENT
Start: 2022-06-23

## 2022-06-23 RX ORDER — BUSPIRONE HYDROCHLORIDE 10 MG/1
TABLET ORAL 3 TIMES DAILY PRN
Qty: 270 TABLET | Refills: 1 | Status: SHIPPED | OUTPATIENT
Start: 2022-06-23

## 2022-06-23 RX ORDER — FLUTICASONE PROPIONATE AND SALMETEROL 250; 50 UG/1; UG/1
1 POWDER RESPIRATORY (INHALATION) EVERY 12 HOURS SCHEDULED
Qty: 3 EACH | Refills: 4 | Status: SHIPPED | OUTPATIENT
Start: 2022-06-23

## 2022-06-23 RX ORDER — CLONAZEPAM 2 MG/1
2 TABLET ORAL 2 TIMES DAILY PRN
Qty: 180 TABLET | Refills: 1 | Status: SHIPPED | OUTPATIENT
Start: 2022-06-23

## 2022-06-23 NOTE — ASSESSMENT & PLAN NOTE
Stable, Continue present management.     Blood Pressure and Cardiac Medications          QUINAPRIL HCL 10 MG Oral Tab

## 2022-06-23 NOTE — ASSESSMENT & PLAN NOTE
As for his Diabetes, it is well controlled, no significant medication side effects noted. Recommendations are: continue present meds, lose weight by increased dietary compliance and exercise and will check labs as ordered.     Lab Results   Component Value Date    A1C 5.6 06/21/2022    A1C 6.4 (H) 09/07/2021      Blood Sugar Medications          Semaglutide,0.25 or 0.5MG/DOS, (OZEMPIC, 0.25 OR 0.5 MG/DOSE,) 2 MG/1.5ML Subcutaneous Solution Pen-injector    METFORMIN HCL  MG Oral Tablet 24 Hr

## 2022-08-02 DIAGNOSIS — F41.1 ANXIETY STATE: ICD-10-CM

## 2022-08-03 RX ORDER — CITALOPRAM 40 MG/1
TABLET ORAL
Qty: 90 TABLET | Refills: 3 | Status: SHIPPED | OUTPATIENT
Start: 2022-08-03

## 2022-10-28 DIAGNOSIS — F41.1 ANXIETY STATE: ICD-10-CM

## 2022-10-28 RX ORDER — CLONAZEPAM 2 MG/1
2 TABLET ORAL 2 TIMES DAILY PRN
Qty: 180 TABLET | Refills: 1 | OUTPATIENT
Start: 2022-10-28

## 2022-10-30 DIAGNOSIS — E78.5 HYPERLIPIDEMIA WITH TARGET LDL LESS THAN 130: ICD-10-CM

## 2022-11-01 RX ORDER — ATORVASTATIN CALCIUM 20 MG/1
TABLET, FILM COATED ORAL
Qty: 90 TABLET | Refills: 0 | Status: SHIPPED | OUTPATIENT
Start: 2022-11-01

## 2022-11-02 DIAGNOSIS — F41.1 ANXIETY STATE: ICD-10-CM

## 2022-11-11 RX ORDER — CLONAZEPAM 2 MG/1
2 TABLET ORAL 2 TIMES DAILY PRN
Qty: 180 TABLET | Refills: 1 | Status: SHIPPED | OUTPATIENT
Start: 2022-11-11

## 2022-11-14 DIAGNOSIS — F41.1 ANXIETY STATE: ICD-10-CM

## 2022-11-16 RX ORDER — BUSPIRONE HYDROCHLORIDE 10 MG/1
TABLET ORAL
Qty: 270 TABLET | Refills: 3 | Status: SHIPPED | OUTPATIENT
Start: 2022-11-16

## 2022-12-11 DIAGNOSIS — I10 ESSENTIAL HYPERTENSION WITH GOAL BLOOD PRESSURE LESS THAN 140/90: ICD-10-CM

## 2022-12-11 RX ORDER — QUINAPRIL 10 MG/1
TABLET ORAL
Qty: 90 TABLET | Refills: 1 | Status: SHIPPED | OUTPATIENT
Start: 2022-12-11

## 2022-12-12 ENCOUNTER — PATIENT MESSAGE (OUTPATIENT)
Dept: FAMILY MEDICINE CLINIC | Facility: CLINIC | Age: 47
End: 2022-12-12

## 2022-12-19 DIAGNOSIS — J45.40 MODERATE PERSISTENT ASTHMA WITHOUT COMPLICATION: ICD-10-CM

## 2022-12-19 DIAGNOSIS — Z91.09 ENVIRONMENTAL ALLERGIES: ICD-10-CM

## 2022-12-26 RX ORDER — ALBUTEROL SULFATE 90 UG/1
2 AEROSOL, METERED RESPIRATORY (INHALATION) EVERY 6 HOURS PRN
Qty: 25.5 EACH | Refills: 3 | Status: SHIPPED | OUTPATIENT
Start: 2022-12-26

## 2023-01-12 DIAGNOSIS — E78.5 HYPERLIPIDEMIA WITH TARGET LDL LESS THAN 130: ICD-10-CM

## 2023-02-21 ENCOUNTER — PATIENT MESSAGE (OUTPATIENT)
Dept: FAMILY MEDICINE CLINIC | Facility: CLINIC | Age: 48
End: 2023-02-21

## 2023-02-27 ENCOUNTER — PATIENT MESSAGE (OUTPATIENT)
Dept: FAMILY MEDICINE CLINIC | Facility: CLINIC | Age: 48
End: 2023-02-27

## 2023-02-27 DIAGNOSIS — E11.65 TYPE 2 DIABETES MELLITUS WITH HYPERGLYCEMIA, WITHOUT LONG-TERM CURRENT USE OF INSULIN (HCC): ICD-10-CM

## 2023-02-27 DIAGNOSIS — Z00.00 LABORATORY EXAMINATION ORDERED AS PART OF A ROUTINE GENERAL MEDICAL EXAMINATION: Primary | ICD-10-CM

## 2023-02-27 NOTE — TELEPHONE ENCOUNTER
From: Paulo Alves  To: Corrinne Rivet, MD  Sent: 2/27/2023 10:50 AM CST  Subject: Need labs ordered    Hello,    Need labs ordered for my March visit.  Please send to [facility].     Thanks

## 2023-02-28 NOTE — TELEPHONE ENCOUNTER
1. Laboratory examination ordered as part of a routine general medical examination (Primary)  -     TSH W Reflex To Free T4  -     CBC, Platelet; No Differential  -     Comp Metabolic Panel (14)  -     Lipid Panel  -     Hemoglobin A1C  -     Microalb/Creat Ratio, Random Urine  2. Type 2 diabetes mellitus with hyperglycemia, without long-term current use of insulin ([facility]  Overview:  Dx 2015, A1c up to 9.0 7/13/2016. and ozempic 11/22/2019   Orders:  -     Comp Metabolic Panel (14)  -     Lipid Panel  -     Hemoglobin A1C  -     Microalb/Creat Ratio, Random Urine       OK to notify.  Thanks, Slime Best MD

## 2023-03-16 RX ORDER — ATORVASTATIN CALCIUM 20 MG/1
TABLET, FILM COATED ORAL
Qty: 90 TABLET | Refills: 0 | Status: SHIPPED | OUTPATIENT
Start: 2023-03-16

## 2023-03-31 ENCOUNTER — TELEPHONE (OUTPATIENT)
Dept: FAMILY MEDICINE CLINIC | Facility: CLINIC | Age: 48
End: 2023-03-31

## 2023-03-31 NOTE — TELEPHONE ENCOUNTER
Patient has a current prior Auth in place     Received response  Coverage granted on Ozempic  04/29/2021 to 04/28/2024    Dr Juan M Basilio may be changing dosage at upcoming appt on 04/28/2023

## 2023-03-31 NOTE — TELEPHONE ENCOUNTER
Closed  3/31/2023 11:29 AM  Case ID: [id number] Close reason: Other   Note from payer: Your PA request cannot be processed electronically. For further inquiries please contact the number on the back of the member prescription card. (Code: 837) - Prescriber details have been updated to match the prescriber directory.    Payer:  LUBA Oro  [phone]       Waiting for Payer Response  3/31/2023 11:01 AM

## 2023-06-07 PROCEDURE — 3044F HG A1C LEVEL LT 7.0%: CPT | Performed by: FAMILY MEDICINE

## 2023-06-08 LAB
ALBUMIN/GLOBULIN RATIO: 2.6 (CALC) (ref 1–2.5)
ALBUMIN: 5 G/DL (ref 3.6–5.1)
ALKALINE PHOSPHATASE: 52 U/L (ref 36–130)
ALT: 32 U/L (ref 9–46)
AST: 22 U/L (ref 10–40)
BILIRUBIN, TOTAL: 0.8 MG/DL (ref 0.2–1.2)
BUN: 7 MG/DL (ref 7–25)
CALCIUM: 9.7 MG/DL (ref 8.6–10.3)
CARBON DIOXIDE: 26 MMOL/L (ref 20–32)
CHLORIDE: 102 MMOL/L (ref 98–110)
CHOL/HDLC RATIO: 2.6 (CALC)
CHOLESTEROL, TOTAL: 153 MG/DL
CREATININE: 0.85 MG/DL (ref 0.6–1.29)
EGFR: 107 ML/MIN/1.73M2
GLOBULIN: 1.9 G/DL (CALC) (ref 1.9–3.7)
GLUCOSE: 121 MG/DL (ref 65–99)
HDL CHOLESTEROL: 59 MG/DL
HEMATOCRIT: 46.4 % (ref 38.5–50)
HEMOGLOBIN A1C: 6 % OF TOTAL HGB
HEMOGLOBIN: 16.2 G/DL (ref 13.2–17.1)
LDL-CHOLESTEROL: 68 MG/DL (CALC)
MCH: 32.5 PG (ref 27–33)
MCHC: 34.9 G/DL (ref 32–36)
MCV: 93 FL (ref 80–100)
MPV: 10.4 FL (ref 7.5–12.5)
NON-HDL CHOLESTEROL: 94 MG/DL (CALC)
PLATELET COUNT: 229 THOUSAND/UL (ref 140–400)
POTASSIUM: 4.1 MMOL/L (ref 3.5–5.3)
PROTEIN, TOTAL: 6.9 G/DL (ref 6.1–8.1)
RDW: 12.1 % (ref 11–15)
RED BLOOD CELL COUNT: 4.99 MILLION/UL (ref 4.2–5.8)
SODIUM: 136 MMOL/L (ref 135–146)
TRIGLYCERIDES: 184 MG/DL
TSH W/REFLEX TO FT4: 1.08 MIU/L (ref 0.4–4.5)
WHITE BLOOD CELL COUNT: 6.9 THOUSAND/UL (ref 3.8–10.8)

## 2023-06-08 NOTE — ASSESSMENT & PLAN NOTE
Cholesterol shows Good control. Stable, continue present management   Cholesterol: 142, done on 6/21/2022. HDL Cholesterol: 55, done on 6/21/2022. TriGlycerides 120, done on 6/21/2022. LDL Cholesterol: 66, done on 6/21/2022. Cholesterol medications include ATORVASTATIN 20 MG Oral Tab [[id number]].

## 2023-06-08 NOTE — ASSESSMENT & PLAN NOTE
BP shows good control with last BP of 132/70. Continue lifestyle changes, diet, exercise and weight loss. Last K was 4.2 done on 6/21/2022. Last Cr was 0.79 done on 6/21/2022. Last GFR (MELECIO) was 107 done on 6/21/2022. Hypertension meds include QUINAPRIL HCL 10 MG Oral Tab [[id number]].      stable, continue present management

## 2023-06-08 NOTE — ASSESSMENT & PLAN NOTE
Diabetic medications include OZEMPIC, 0.25 OR 0.5 MG/DOSE, 2 MG/1.5ML Subcutaneous Solution Pen-injector [[id number]], semaglutide (OZEMPIC, 0.25 OR 0.5 MG/DOSE,) 2 MG/1.5ML Subcutaneous Solution Pen-injector [[id number]].  stable, continue present management

## 2023-06-08 NOTE — ASSESSMENT & PLAN NOTE
Asthma (moderate persistent) is under Excellent control and Good compliance.   Asthma controller Meds: fluticasone-salmeterol Aepb - 250-50 MCG/ACT  Asthma rescue Meds: clonazePAM Tabs - 2 MG   stable, continue present management

## 2023-06-09 ENCOUNTER — TELEPHONE (OUTPATIENT)
Dept: FAMILY MEDICINE CLINIC | Facility: CLINIC | Age: 48
End: 2023-06-09

## 2023-06-09 ENCOUNTER — OFFICE VISIT (OUTPATIENT)
Dept: FAMILY MEDICINE CLINIC | Facility: CLINIC | Age: 48
End: 2023-06-09
Payer: COMMERCIAL

## 2023-06-09 VITALS
WEIGHT: 172.19 LBS | DIASTOLIC BLOOD PRESSURE: 84 MMHG | BODY MASS INDEX: 22.82 KG/M2 | HEART RATE: 82 BPM | SYSTOLIC BLOOD PRESSURE: 130 MMHG | RESPIRATION RATE: 14 BRPM | HEIGHT: 73 IN

## 2023-06-09 DIAGNOSIS — N18.1 CKD (CHRONIC KIDNEY DISEASE) STAGE 1, GFR 90 ML/MIN OR GREATER: ICD-10-CM

## 2023-06-09 DIAGNOSIS — Z13.6 SCREENING FOR CARDIOVASCULAR CONDITION: ICD-10-CM

## 2023-06-09 DIAGNOSIS — E11.65 TYPE 2 DIABETES MELLITUS WITH HYPERGLYCEMIA, WITHOUT LONG-TERM CURRENT USE OF INSULIN (HCC): Primary | ICD-10-CM

## 2023-06-09 DIAGNOSIS — E78.5 DYSLIPIDEMIA: ICD-10-CM

## 2023-06-09 DIAGNOSIS — I10 ESSENTIAL HYPERTENSION: ICD-10-CM

## 2023-06-09 DIAGNOSIS — Z13.1 SCREENING FOR DIABETES MELLITUS: Primary | ICD-10-CM

## 2023-06-09 DIAGNOSIS — J45.40 MODERATE PERSISTENT ASTHMA WITHOUT COMPLICATION: ICD-10-CM

## 2023-06-09 DIAGNOSIS — R73.9 HYPERGLYCEMIA: ICD-10-CM

## 2023-06-09 DIAGNOSIS — Z11.59 ENCOUNTER FOR HEPATITIS C SCREENING TEST FOR LOW RISK PATIENT: ICD-10-CM

## 2023-06-09 DIAGNOSIS — I10 ESSENTIAL HYPERTENSION: Chronic | ICD-10-CM

## 2023-06-09 DIAGNOSIS — F41.1 ANXIETY STATE: ICD-10-CM

## 2023-06-09 DIAGNOSIS — Z00.00 LABORATORY EXAMINATION ORDERED AS PART OF A ROUTINE GENERAL MEDICAL EXAMINATION: ICD-10-CM

## 2023-06-09 DIAGNOSIS — E78.2 MIXED HYPERLIPIDEMIA: ICD-10-CM

## 2023-06-09 PROCEDURE — 3075F SYST BP GE 130 - 139MM HG: CPT | Performed by: FAMILY MEDICINE

## 2023-06-09 PROCEDURE — 99214 OFFICE O/P EST MOD 30 MIN: CPT | Performed by: FAMILY MEDICINE

## 2023-06-09 PROCEDURE — 3008F BODY MASS INDEX DOCD: CPT | Performed by: FAMILY MEDICINE

## 2023-06-09 PROCEDURE — 3079F DIAST BP 80-89 MM HG: CPT | Performed by: FAMILY MEDICINE

## 2023-06-09 RX ORDER — CLONAZEPAM 2 MG/1
2 TABLET ORAL 2 TIMES DAILY PRN
Qty: 180 TABLET | Refills: 1 | Status: SHIPPED | OUTPATIENT
Start: 2023-06-09

## 2023-06-09 RX ORDER — LOSARTAN POTASSIUM 50 MG/1
50 TABLET ORAL DAILY
Qty: 90 TABLET | Refills: 3 | Status: SHIPPED | OUTPATIENT
Start: 2023-06-09

## 2023-06-09 RX ORDER — ATORVASTATIN CALCIUM 20 MG/1
20 TABLET, FILM COATED ORAL DAILY
Qty: 90 TABLET | Refills: 3 | Status: SHIPPED | OUTPATIENT
Start: 2023-06-09

## 2023-06-09 NOTE — TELEPHONE ENCOUNTER
Please enter lab orders for the patient's upcoming physical appointment. Physical scheduled: Your appointments     Date & Time Appointment Department [facility])    Dec 08, 2023 11:30 AM CST Physical - Established with Sloane Pearl MD [address], [address] [address], [address] [facility] ([address])            [place] [address] [facility] [id number]         Preferred lab: [facility]     The patient has been notified to complete fasting labs prior to their physical appointment.

## 2023-06-12 ENCOUNTER — TELEPHONE (OUTPATIENT)
Dept: FAMILY MEDICINE CLINIC | Facility: CLINIC | Age: 48
End: 2023-06-12

## 2023-10-01 DIAGNOSIS — F41.1 ANXIETY STATE: ICD-10-CM

## 2023-10-03 DIAGNOSIS — F41.1 ANXIETY STATE: ICD-10-CM

## 2023-10-05 RX ORDER — CITALOPRAM 40 MG/1
40 TABLET ORAL DAILY
Qty: 90 TABLET | Refills: 3 | Status: SHIPPED | OUTPATIENT
Start: 2023-10-05

## 2023-10-05 NOTE — TELEPHONE ENCOUNTER
Approved refill
Please send to [facility], patient is out of the medication on Monday.
Pt is calling to have the pharmacy switched
Pt requesting Citalopram to [facility]    Last prescribed 8/3/2022 #90 with 3 refills     LOV 6/9/2023      Future Appointments   Date Time Provider Joellen Reid   12/8/2023 11:30 AM Anali Holliday MD [facility]      Routed to Dr. Deng Frederick
22-Apr-2023 06:00

## 2023-10-07 RX ORDER — CITALOPRAM 40 MG/1
TABLET ORAL
Qty: 90 TABLET | Refills: 3 | OUTPATIENT
Start: 2023-10-07

## 2023-11-05 NOTE — TELEPHONE ENCOUNTER
1. Screening for diabetes mellitus (Primary)  -     Comp Metabolic Panel (14)  2. Screening for cardiovascular condition  -     Lipid Panel  3. Encounter for hepatitis C screening test for low risk patient  -     HCV Antibody  4. Laboratory examination ordered as part of a routine general medical examination  -     Lipid Panel  -     Comp Metabolic Panel (14)  -     Hemoglobin A1C  5. Hyperglycemia  -     Comp Metabolic Panel (14)  -     Hemoglobin A1C  6. Dyslipidemia  -     Lipid Panel  7. CKD (chronic kidney disease) stage 1, GFR 90 ml/min or greater  -     Microalb/Creat Ratio, Random Urine  8. Essential hypertension  Overview:  Quinapril 10, diltiazem 180 ER (Because of elevated resting HR of )  Orders:  -     Comp Metabolic Panel (14)       OK to notify.  Thanks, Nisha Sadler MD

## 2023-11-10 RX ORDER — SEMAGLUTIDE 1.34 MG/ML
1 INJECTION, SOLUTION SUBCUTANEOUS
Refills: 3 | OUTPATIENT
Start: 2023-11-10

## 2023-11-10 NOTE — TELEPHONE ENCOUNTER
Per Dr. Génesis West last office visit note, changed Ozempic to [facility].

## 2023-11-13 NOTE — TELEPHONE ENCOUNTER
Refill request for:    Requested Prescriptions     Pending Prescriptions Disp Refills    semaglutide 4 MG/3ML Subcutaneous Solution Pen-injector 3 each 3     Sig: Inject 1 mg into the skin once a week. Last Prescribed Quantity Refills   6/9/23 3 3     LOV 6/9/2023     Patient was asked to follow-up in: 6 months    Appointment due: December 2023    Appointment scheduled: 12/8/2023 Linette Euceda MD    Medication not on protocol.      # 3 with 3 refills routed to Hazel Franco MD for review

## 2023-12-27 NOTE — TELEPHONE ENCOUNTER
AMG Pedro Mike MD        Potassium Standard  Received a fax from [employer] confirming [address] prior authorization continue to be in place Replacement Protocol (Levels 3.5 and lower)   Does not apply See Admin Instructions Pedro Mike MD           Current Facility-Administered Medications   Medication Dose Route Frequency Provider Last Rate Last Admin    sodium chloride 0.9% infusion   Intravenous Continuous Leeanna Eisenberg  mL/hr at 12/27/23 0618 New Bag at 12/27/23 0618       Current Facility-Administered Medications   Medication Dose Route Frequency Provider Last Rate Last Admin    morphine injection 2 mg  2 mg Intravenous Q4H PRN Leeanna Eisenberg MD   2 mg at 12/26/23 2237    HYDROcodone-acetaminophen (NORCO) 5-325 MG per tablet 1 tablet  1 tablet Oral Q4H PRN Leeanna Eisenberg MD   1 tablet at 12/26/23 2354    LORazepam (ATIVAN) tablet 2 mg  2 mg Oral Q1H PRN Leeanna Eisenberg MD        Or    LORazepam (ATIVAN) tablet 3 mg  3 mg Oral Q1H PRN Leeanna Eisenberg MD        Or    LORazepam (ATIVAN) injection 2 mg  2 mg Intravenous Q1H PRN Leeanna Eisenberg MD        Or    LORazepam (ATIVAN) injection 3 mg  3 mg Intravenous Q1H PRN Leeanna Eisenberg MD        sodium chloride 0.9 % flush bag 25 mL  25 mL Intravenous PRN Leeanna Eisenberg MD        ondansetron (ZOFRAN) injection 4 mg  4 mg Intravenous Q8H PRN Leeanna Eisenberg MD        sodium chloride 0.9 % flush bag 25 mL  25 mL Intravenous PRN Pedro Mike MD            HOME MEDS  Prior to Admission medications    Medication Sig Start Date End Date Taking? Authorizing Provider   cholestyramine (QUESTRAN) 4 g packet Take 1 packet by mouth in the morning and 1 packet in the evening. Take with meals. Do all this for 14 days. 12/20/23 1/3/24 Yes Lilly Ware PA-C   folic acid (FOLATE) 1 MG tablet Take 1 tablet by mouth daily. Do not start before December 9, 2023. 12/9/23 1/8/24 Yes Namrata El MD   pantoprazole (PROTONIX) 40 Gilmore's sign.  Partial  obscuration of the pancreas by bowel gas.    *Reference made to CT of the abdomen and pelvis performed on December 8, 2023 describing findings of mild pancreatitis including fat stranding in  the tail region. Also, subcentimeter hypodense liver lesion too small to  characterize by CT. Enlarged bilateral external iliac chain lymph nodes.    On the present ultrasound examination limited visualization of pancreas. No  obvious liver lesion. Normal appearing gallbladder. No stones or evidence  for cholecystitis. Hepatopetal flow within the portal venous system.  Negative ultrasound Gilmore sign. Unremarkable liver. Normal caliber ducts.    - Impression -  Limited visualization of the pancreas but otherwise, normal  scan.        Electronically Signed by: LIVAN TOLENTINO M.D.  Signed on: 12/24/2023 7:59 AM  Workstation ID: [id number]      === 12/07/23 ===    US LIVER GALLBLADDER PANCREAS (RUQ)    - Narrative -  EXAM: US LIVER GALLBLADDER PANCREAS (RUQ) 12/7/2023    CLINICAL INDICATION: Epigastric abdominal pain.    COMPARISON: None.    Sonographic images of right upper quadrant of abdomen are submitted.    FINDINGS: Visualized liver appears grossly unremarkable.  Visualized portal  vein appears grossly patent with normal direction of flow.    Gallbladder appears mildly contracted but otherwise grossly unremarkable.  No evidence for cholelithiasis, gallbladder wall thickening, or  pericholecystic fluid is noted.  Visualized bile ducts appear within normal  limits.  Sonographic Gilmore's sign is not elicited during the study  according to technologist.    Visualized pancreas appears grossly unremarkable; however, portions of  pancreas appear obscured by overlying bowel gas.    No significant free fluid is seen within right upper quadrant of abdomen.    - Impression -  No significant sonographic abnormality of visualized liver,  gallbladder, and pancreas with portions of pancreas obscured by overlying  bowel gas.   Recommend clinical correlation and follow-up as clinically  warranted.    Electronically Signed by: JESUS SIU M.D.  Signed on: 12/7/2023 9:13 PM  Workstation ID: [id number]      Microbiology Results       None            * Cannot find OR log *        All imaging, labs, vitals and related tests have been reviewed and interpreted by me.       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    Acute on chronic pancreatitis  Recent admission for acute pancreatitis 2/2 etoh   Now with mild recurrence based on symptoms and recent hx  Pt denied etoh for past 4 week and etoh level 0, counseled on continued abstinence  Morphine 2q4 prn  Strict I/O  Reinitiate feedings with low fat diet    Alcohol abuse history with alcoholic hepatitis  Elevated liver enzymes  Fatty liver  [facility] protocol initiated  thiamine, multivitamins, folate  monitor potassium and magnesium levels  Chemical dependency consulted    Tobacco use     Nutrition status: Does Not Meet Criteria for Malnutrition     Checklist:  DVT Prophylaxis:   Current Active Medications for DVT Prophylaxis (From admission, onward)           Stop     enoxaparin (LOVENOX) injection 40 mg  40 mg,   Subcutaneous,   EVERY 24 HOURS         --                   GI Prophylaxis: PPI  Electrolytes: Replete as needed  Diet: Nutrition Communication  Regular, Fat Low (50 Gm) Diet  Baseline Activity: Ambulates Independently  PT/OT: N/a  Tele: n/a  T/L/D: PIV    CODE STATUS:   Code Status: Full Resuscitation,  decisional  PCP: Nella Lubin MD  Disposition: [facility]    Physician Notification:  Discussed care with consultants.  Communication: with patient, nurse   Reviewed EMR and outside hospital records/Care Everywhere for prior relevant documentation, labs and testing.    MORE than 50 MINS WERE SPENT ON THIS PATIENTS CARE TODAY. This includes the following: Reviewed all vitals, medications, new orders,  I/O, labs, micro, radiology, nurses notes, pertinent consultant notes which are reflected in assessment and plan.This does not include time spent on other items of care such as smoking cessation counseling, prolonged care time, and or advanced care planning if applicable.       MD PRABHJOT [facility] Hospital[facility]  Can be reached via [employer]

## 2024-01-16 DIAGNOSIS — Z91.09 ENVIRONMENTAL ALLERGIES: ICD-10-CM

## 2024-01-16 DIAGNOSIS — J45.40 MODERATE PERSISTENT ASTHMA WITHOUT COMPLICATION: ICD-10-CM

## 2024-01-17 RX ORDER — ALBUTEROL SULFATE 90 UG/1
2 AEROSOL, METERED RESPIRATORY (INHALATION) EVERY 6 HOURS PRN
Qty: 3 EACH | Refills: 0 | Status: SHIPPED | OUTPATIENT
Start: 2024-01-17

## 2024-01-17 NOTE — TELEPHONE ENCOUNTER
Requested Prescriptions     Pending Prescriptions Disp Refills    ALBUTEROL 108 (90 Base) MCG/ACT Inhalation Aero Soln [Pharmacy Med Name: ALBUTEROL HFA (PROAIR) INHALER] 25.5 each 3     Sig: INHALE 2 PUFFS INTO THE LUNGS EVERY 6 HOURS AS NEEDED FOR WHEEZE     LOV 6/9/2023     Patient was asked to follow-up in: 6 months    Appointment scheduled: 2/14/2024 Jamar Palumbo MD     Medication refilled per protocol.

## 2024-01-19 PROBLEM — R79.89 ELEVATED LFTS: Status: ACTIVE | Noted: 2024-01-19

## 2024-01-19 LAB
ALBUMIN/GLOBULIN RATIO: 2.2 (CALC) (ref 1–2.5)
ALBUMIN: 4.6 G/DL (ref 3.6–5.1)
ALKALINE PHOSPHATASE: 47 U/L (ref 36–130)
ALT: 92 U/L (ref 9–46)
AST: 69 U/L (ref 10–40)
BILIRUBIN, TOTAL: 1 MG/DL (ref 0.2–1.2)
BUN: 7 MG/DL (ref 7–25)
CALCIUM: 8.9 MG/DL (ref 8.6–10.3)
CARBON DIOXIDE: 26 MMOL/L (ref 20–32)
CHLORIDE: 104 MMOL/L (ref 98–110)
CHOL/HDLC RATIO: 2.6 (CALC)
CHOLESTEROL, TOTAL: 164 MG/DL
CREATININE: 0.8 MG/DL (ref 0.6–1.29)
EGFR: 109 ML/MIN/1.73M2
GLOBULIN: 2.1 G/DL (CALC) (ref 1.9–3.7)
GLUCOSE: 66 MG/DL (ref 65–99)
HDL CHOLESTEROL: 62 MG/DL
HEMOGLOBIN A1C: 5.3 % OF TOTAL HGB
LDL-CHOLESTEROL: 83 MG/DL (CALC)
NON-HDL CHOLESTEROL: 102 MG/DL (CALC)
POTASSIUM: 4.3 MMOL/L (ref 3.5–5.3)
PROTEIN, TOTAL: 6.7 G/DL (ref 6.1–8.1)
SODIUM: 140 MMOL/L (ref 135–146)
TRIGLYCERIDES: 96 MG/DL

## 2024-02-13 NOTE — ASSESSMENT & PLAN NOTE
Last K was 4.3 done on 1/18/2024.  Last Cr was 0.8 done on 1/18/2024.  Last eGFR was 109 on 1/18/2024.  BP Meds: losartan Tabs - 50 MG

## 2024-02-13 NOTE — ASSESSMENT & PLAN NOTE
Cholesterol shows Good control. Long term heart-healthy diet and lifestyle discussed and encouraged to reduce risk of cardiovascular disease.  Cholesterol: 164, done on 1/18/2024.  HDL Cholesterol: 62, done on 1/18/2024.  TriGlycerides 96, done on 1/18/2024.  LDL Cholesterol: 83, done on 1/18/2024.   Cholesterol medications include atorvastatin 20 MG Oral Tab [[id number]], atorvastatin 20 MG Oral Tab [[id number]] (Long-Term Med).

## 2024-02-13 NOTE — ASSESSMENT & PLAN NOTE
Asthma (mild intermittent) is under Excellent control and Good compliance.  Asthma controller Meds: fluticasone-salmeterol Aepb - 250-50 MCG/ACT  Asthma rescue Meds: clonazePAM Tabs - 2 MG

## 2024-02-13 NOTE — ASSESSMENT & PLAN NOTE
Diabetic medications include semaglutide 4 MG/3ML Subcutaneous Solution Pen-injector [[id number]], semaglutide 4 MG/3ML Subcutaneous Solution Pen-injector [[id number]] (Long-Term Med).

## 2024-02-14 ENCOUNTER — OFFICE VISIT (OUTPATIENT)
Dept: FAMILY MEDICINE CLINIC | Facility: CLINIC | Age: 49
End: 2024-02-14
Payer: COMMERCIAL

## 2024-02-14 VITALS
HEIGHT: 73 IN | DIASTOLIC BLOOD PRESSURE: 80 MMHG | BODY MASS INDEX: 20.15 KG/M2 | SYSTOLIC BLOOD PRESSURE: 120 MMHG | RESPIRATION RATE: 14 BRPM | WEIGHT: 152 LBS | HEART RATE: 84 BPM

## 2024-02-14 DIAGNOSIS — F41.1 ANXIETY STATE: ICD-10-CM

## 2024-02-14 DIAGNOSIS — J45.40 MODERATE PERSISTENT ASTHMA WITHOUT COMPLICATION: ICD-10-CM

## 2024-02-14 DIAGNOSIS — I10 ESSENTIAL HYPERTENSION: Chronic | ICD-10-CM

## 2024-02-14 DIAGNOSIS — R79.89 ELEVATED LFTS: ICD-10-CM

## 2024-02-14 DIAGNOSIS — E78.2 MIXED HYPERLIPIDEMIA: ICD-10-CM

## 2024-02-14 DIAGNOSIS — E11.65 TYPE 2 DIABETES MELLITUS WITH HYPERGLYCEMIA, WITHOUT LONG-TERM CURRENT USE OF INSULIN (HCC): ICD-10-CM

## 2024-02-14 DIAGNOSIS — Z00.00 ANNUAL PHYSICAL EXAM: Primary | ICD-10-CM

## 2024-02-14 DIAGNOSIS — L40.9 PSORIASIS: ICD-10-CM

## 2024-02-14 PROCEDURE — 99213 OFFICE O/P EST LOW 20 MIN: CPT | Performed by: FAMILY MEDICINE

## 2024-02-14 PROCEDURE — 99396 PREV VISIT EST AGE 40-64: CPT | Performed by: FAMILY MEDICINE

## 2024-02-14 RX ORDER — PEN NEEDLE, DIABETIC 32GX 5/32"
1 NEEDLE, DISPOSABLE MISCELLANEOUS WEEKLY
Qty: 100 EACH | Refills: 1 | Status: SHIPPED | OUTPATIENT
Start: 2024-02-14 | End: 2025-02-13

## 2024-02-14 NOTE — PROGRESS NOTES
Amrit Ross is a 48 year old male who presents for a complete physical exam.     had concerns including Physical (Annual ) and Diabetes (Follow up ).   No topic due editable text      Subjective:    He complains of doing very well, weight way down. Last A1c value was 5.3% done 1/18/2024. On ozemic 1 mg, had 2 months without last fall, now very good control.   Has lost more weight. LFT up 1/18 but recent URI seen at [facility] for this. Feels great, ozempic really heping.  Tobacco:  He has never smoked tobacco.     Wt Readings from Last 4 Encounters:   02/14/24 152 lb (68.9 kg)   06/09/23 172 lb 3.2 oz (78.1 kg)   06/23/22 165 lb 3.2 oz (74.9 kg)   09/09/21 175 lb 3.2 oz (79.5 kg)     Body mass index is 20.05 kg/m².     The 10-year ASCVD risk score (Vickey GODWIN, et al., 2019) is: 3.3%    Values used to calculate the score:      Age: 48 years      Sex: Male      Is Non- : No      Diabetic: Yes      Tobacco smoker: No      Systolic Blood Pressure: 120 mmHg      Is BP treated: Yes      HDL Cholesterol: 62 mg/dL      Total Cholesterol: 164 mg/dL    Chief Complaint Reviewed and Verified  Nursing Notes Reviewed and   Verified  Tobacco Reviewed  Allergies Reviewed  Medications Reviewed    Problem List Reviewed  Medical History Reviewed  Surgical History   Reviewed  Family History Reviewed          His family history includes Arthritis in his mother; Heart Disease in his paternal grandfather; Mental Disorder in his paternal grandmother.   He  reports that he has never smoked. He has never used smokeless tobacco. He reports that he does not drink alcohol and does not use drugs.    Exercise: three times per week.  Diet: watches minimally    There are no preventive care reminders to display for this patient.   No results found for this or any previous visit.     Health Maintenance   Topic Date Due    Colorectal Cancer Screening  10/13/2025      Pneumococcal Vaccination(1 of 2 - PCV) Never done   Health  Maintenance Due   Topic Date Due    Asthma Action Plan  Never done    Diabetes Care: Microalb/Creat Ratio  Never done    Annual Physical  Never done    Pneumococcal Vaccine: Birth to 64yrs (1 of 2 - PCV) Never done    Asthma Control Test  02/28/2019    Diabetes Care Dilated Eye Exam  11/18/2020    COVID-19 Vaccine (4 - 2023-24 season) 09/01/2023    Influenza Vaccine (1) 10/01/2023    Annual Depression Screening  01/01/2024    HTN: BP Follow-Up  03/14/2024         Review of Systems   Constitutional:  Positive for unexpected weight change. Negative for activity change, appetite change, chills and fever.   HENT: Negative.     Eyes: Negative.    Respiratory: Negative.  Negative for shortness of breath.    Cardiovascular: Negative.  Negative for chest pain and palpitations.   Gastrointestinal: Negative.  Negative for abdominal pain.   Genitourinary: Negative.  Negative for dysuria.   Musculoskeletal:  Negative for arthralgias.   Skin: Negative.  Negative for rash.   Allergic/Immunologic: Negative.    Neurological: Negative.         Results:    Lab Results   Component Value Date/Time    WBC 6.9 06/07/2023 11:14 AM    HGB 16.2 06/07/2023 11:14 AM     06/07/2023 11:14 AM      Lab Results   Component Value Date/Time    GLU 66 01/18/2024 11:06 AM     01/18/2024 11:06 AM    K 4.3 01/18/2024 11:06 AM     01/18/2024 11:06 AM    CO2 26 01/18/2024 11:06 AM    CREATSERUM 0.80 01/18/2024 11:06 AM    CA 8.9 01/18/2024 11:06 AM    ALB 4.6 01/18/2024 11:06 AM    TP 6.7 01/18/2024 11:06 AM    ALKPHO 47 01/18/2024 11:06 AM    AST 69 (H) 01/18/2024 11:06 AM    ALT 92 (H) 01/18/2024 11:06 AM    BILT 1.0 01/18/2024 11:06 AM    TSH 1.370 10/05/2018 12:00 AM        Lab Results   Component Value Date/Time    CHOLEST 164 01/18/2024 11:06 AM    HDL 62 01/18/2024 11:06 AM    TRIG 96 01/18/2024 11:06 AM    LDL 83 01/18/2024 11:06 AM    NONHDLC 102 01/18/2024 11:06 AM       Last A1c value was 5.3% done 1/18/2024.     Vitamin D:   No results found for: \"VITD\"       Objective:    EXAM:  /80   Pulse 84   Resp 14   Ht 6' 1\" (1.854 m)   Wt 152 lb (68.9 kg)   BMI 20.05 kg/m²  Estimated body mass index is 20.05 kg/m² as calculated from the following:    Height as of this encounter: 6' 1\" (1.854 m).    Weight as of this encounter: 152 lb (68.9 kg).   Physical Exam  Vitals and nursing note reviewed.   Constitutional:       General: He is not in acute distress.     Appearance: Normal appearance. He is normal weight.   HENT:      Head: Normocephalic and atraumatic.      Right Ear: Tympanic membrane and external ear normal.      Left Ear: Tympanic membrane and external ear normal.      Nose: Nose normal.      Mouth/Throat:      Mouth: Mucous membranes are moist.   Eyes:      Extraocular Movements: Extraocular movements intact.      Pupils: Pupils are equal, round, and reactive to light.   Cardiovascular:      Rate and Rhythm: Normal rate and regular rhythm.      Pulses: Normal pulses.           Carotid pulses are 2+ on the right side and 2+ on the left side.       Radial pulses are 2+ on the right side and 2+ on the left side.        Dorsalis pedis pulses are 2+ on the right side and 2+ on the left side.        Posterior tibial pulses are 2+ on the right side and 2+ on the left side.      Heart sounds: Normal heart sounds, S1 normal and S2 normal. No murmur heard.  Pulmonary:      Effort: Pulmonary effort is normal.      Breath sounds: Normal breath sounds.   Abdominal:      General: Abdomen is flat. Bowel sounds are normal. There is no distension.      Palpations: Abdomen is soft.   Musculoskeletal:         General: Normal range of motion.      Cervical back: Normal range of motion and neck supple.      Right lower leg: No edema.      Left lower leg: No edema.   Skin:     General: Skin is warm and dry.      Capillary Refill: Capillary refill takes less than 2 seconds.   Neurological:      General: No focal deficit present.      Mental  Status: He is alert and oriented to person, place, and time.   Psychiatric:         Mood and Affect: Mood normal.         Behavior: Behavior normal.         Thought Content: Thought content normal.          Assessment & Plan:    Amrit Ross is a 48 year old male who presents for a complete physical exam.   Pt's weight is Body mass index is 20.05 kg/m²., recommended low fat diet and aerobic exercise 30 minutes three times weekly.   Health maintenance, Up to date   Immunizations: Up to date   Immunization History   Administered Date(s) Administered    Afluria, 9 Years & >, IM 12/07/2015    Covid-19 Vaccine Pfizer 30 mcg/0.3 ml 03/25/2021, 04/15/2021, 11/27/2021    FLULAVAL 6 months & older 0.5 ml Prefilled syringe ([id number]) 10/08/2018, 11/22/2019, 09/18/2020    FLUZONE 6 months and older PFS 0.5 ml ([id number]) 10/08/2018, 11/22/2019, 09/18/2020    Influenza 10/01/2003, 12/01/2005, 10/01/2007, 10/01/2009, 10/07/2013    TDAP 11/22/2019         Pt info given for: exercise, low fat diet, The patient indicates understanding of these issues and agrees to the plan.  The patient is asked to return for CPX in 1 years.    Assessment:  1. Annual physical exam (Primary)  2. Type 2 diabetes mellitus with hyperglycemia, without long-term current use of insulin (HCC)  Overview:  Dx 2015, A1c up to 9.0 7/13/2016.   and ozempic 11/22/2019   Assessment & Plan:  Diabetic medications include semaglutide 4 MG/3ML Subcutaneous Solution Pen-injector [[id number]], semaglutide 4 MG/3ML Subcutaneous Solution Pen-injector [[id number]] (Long-Term Med).   3. Essential hypertension  Overview:  Quinapril 10, diltiazem 180 ER (Because of elevated resting HR of )  Assessment & Plan:  Last K was 4.3 done on 1/18/2024.  Last Cr was 0.8 done on 1/18/2024.  Last eGFR was 109 on 1/18/2024.  BP Meds: losartan Tabs - 50 MG    4. Mixed hyperlipidemia  Overview:  Atorvastatin 20  Assessment & Plan:  Cholesterol shows Good control. Long term heart-healthy diet  and lifestyle discussed and encouraged to reduce risk of cardiovascular disease.  Cholesterol: 164, done on 1/18/2024.  HDL Cholesterol: 62, done on 1/18/2024.  TriGlycerides 96, done on 1/18/2024.  LDL Cholesterol: 83, done on 1/18/2024.   Cholesterol medications include atorvastatin 20 MG Oral Tab [[id number]], atorvastatin 20 MG Oral Tab [[id number]] (Long-Term Med).     5. Moderate persistent asthma without complication  Overview:  Symbicort 160 BID, Proair HFA  Assessment & Plan:  Asthma (mild intermittent) is under Excellent control and Good compliance.  Asthma controller Meds: fluticasone-salmeterol Aepb - 250-50 MCG/ACT  Asthma rescue Meds: clonazePAM Tabs - 2 MG   6. Elevated LFTs  Overview:  1/18/2024: ALT 92 (H); AST 69 (H) with Diabetes Mellitus under great control  Assessment & Plan:  Worse recently. Wotking on improving.  Orders:  -     AST (SGOT)  -     ALT(SGPT)  7. Psoriasis  Overview:  sBetamethasone PRN rash  Assessment & Plan:  Stable, continue present management   8. Anxiety state  Overview:  tegrotol and clonazepam, trazodone 100 and citalopram 40  Assessment & Plan:  Stable, continue present management   Other orders  -     BD Pen Needle Fabiola U/F; Inject 1 Pen into the skin once a week.  Dispense: 100 each; Refill: 1   Zoempic, cut to 0.5 weekly buy using less of pen.     Overall doing well, normal liver exam. Repeat as it annemarie be related to recent URI. If normal, repeat in 6 months, if abnormal, will get US.     Ozempic is too high so cut to 0-Fill this prescription after 58 days. (Using 30 clicks instead of full 60 clicks) of 1 mg size and reassess in 3  to 6 months.   I am having Amrit Ross start on BD Pen Needle Fabiola U/F. I am also having him maintain his traZODone, cetirizine, triamcinolone, fluticasone-salmeterol, busPIRone, atorvastatin, losartan, clonazePAM, citalopram, semaglutide, and albuterol.     Return in about 6 months (around 8/14/2024) for diabetes follow up.

## 2024-02-21 DIAGNOSIS — F41.1 ANXIETY STATE: ICD-10-CM

## 2024-02-22 RX ORDER — CLONAZEPAM 2 MG/1
2 TABLET ORAL 2 TIMES DAILY PRN
Qty: 180 TABLET | Refills: 1 | Status: SHIPPED | OUTPATIENT
Start: 2024-02-22

## 2024-02-22 NOTE — TELEPHONE ENCOUNTER
Refill request for:    Requested Prescriptions     Pending Prescriptions Disp Refills    clonazePAM 2 MG Oral Tab 180 tablet 1     Sig: Take 1 tablet (2 mg total) by mouth 2 (two) times daily as needed for Anxiety.        Last Prescribed Quantity Refills   6/9/2023 180 1     LOV 2/14/2024     Patient was asked to follow-up in:     Appointment due:     Appointment scheduled: 8/7/2024 Jamar Palumbo MD    Medication not on protocol.     Routed to Dr. Palumbo

## 2024-04-16 DIAGNOSIS — J45.40 MODERATE PERSISTENT ASTHMA WITHOUT COMPLICATION (HCC): ICD-10-CM

## 2024-04-16 DIAGNOSIS — Z91.09 ENVIRONMENTAL ALLERGIES: ICD-10-CM

## 2024-04-16 RX ORDER — ALBUTEROL SULFATE 90 UG/1
2 AEROSOL, METERED RESPIRATORY (INHALATION) EVERY 6 HOURS PRN
Qty: 25.5 EACH | Refills: 0 | Status: SHIPPED | OUTPATIENT
Start: 2024-04-16

## 2024-04-16 NOTE — TELEPHONE ENCOUNTER
Requested Prescriptions     Pending Prescriptions Disp Refills    ALBUTEROL 108 (90 Base) MCG/ACT Inhalation Aero Soln [Pharmacy Med Name: ALBUTEROL HFA (PROAIR) INHALER] 25.5 each 0     Sig: INHALE 2 PUFFS INTO THE LUNGS EVERY 6 HOURS AS NEEDED FOR WHEEZE     LOV 2/14/2024     Patient was asked to follow-up in:     Appointment scheduled: 8/7/2024 Jamar Palumbo MD     Medication refilled per protocol.

## 2024-05-06 ENCOUNTER — TELEPHONE (OUTPATIENT)
Dept: FAMILY MEDICINE CLINIC | Facility: CLINIC | Age: 49
End: 2024-05-06

## 2024-05-06 NOTE — TELEPHONE ENCOUNTER
Received fax from [employer], prior authorization required for Ozempic 1 MG Dose 4MG/3ML pen-injectors.     Key# [id number]    Called patient and left message on machine explaining process. Fax placed in Pat's in box

## 2024-05-10 NOTE — TELEPHONE ENCOUNTER
Approved    Prior authorization approved  Payer: [employer]ud[employer] Case ID: [id number]    [phone]    [phone]  Note from payer: Your PA request has been approved.  Additional information will be provided in the approval communication. (Message 1142)  Approval Details    Authorized from May 9, 2024 to May 9, 2027        Routed to front staff - please notify patient of approval

## 2024-05-13 NOTE — TELEPHONE ENCOUNTER
LM medication is approve by insurance.    Ozempic 1 MG Dose 4MG/3ML pen-injectors.      Key# [id number]

## 2024-05-21 DIAGNOSIS — I10 ESSENTIAL HYPERTENSION: Chronic | ICD-10-CM

## 2024-05-26 DIAGNOSIS — I10 ESSENTIAL HYPERTENSION: Chronic | ICD-10-CM

## 2024-05-26 DIAGNOSIS — E78.2 MIXED HYPERLIPIDEMIA: ICD-10-CM

## 2024-05-26 RX ORDER — LOSARTAN POTASSIUM 50 MG/1
50 TABLET ORAL DAILY
Qty: 90 TABLET | Refills: 3 | Status: CANCELLED | OUTPATIENT
Start: 2024-05-26

## 2024-05-29 RX ORDER — LOSARTAN POTASSIUM 50 MG/1
50 TABLET ORAL DAILY
Qty: 90 TABLET | Refills: 3 | Status: SHIPPED | OUTPATIENT
Start: 2024-05-29

## 2024-05-29 NOTE — TELEPHONE ENCOUNTER
Requested Prescriptions     Pending Prescriptions Disp Refills    LOSARTAN 50 MG Oral Tab [Pharmacy Med Name: LOSARTAN TAB 50MG] 90 tablet 3     Sig: TAKE 1 TABLET DAILY     LOV 2/14/2024     Patient was asked to follow-up in: 6 months    Appointment scheduled: 8/7/2024 Jamar Palumbo MD     Medication refilled per protocol.

## 2024-06-06 RX ORDER — ATORVASTATIN CALCIUM 20 MG/1
20 TABLET, FILM COATED ORAL DAILY
Qty: 90 TABLET | Refills: 1 | Status: SHIPPED | OUTPATIENT
Start: 2024-06-06

## 2024-06-06 NOTE — TELEPHONE ENCOUNTER
Requested Prescriptions     Pending Prescriptions Disp Refills    atorvastatin 20 MG Oral Tab 90 tablet 3     Sig: Take 1 tablet (20 mg total) by mouth daily.     LOV 2/14/2024     Patient was asked to follow-up in: 6 months    Appointment scheduled: 8/7/2024 Jamar Palumbo MD     Medication refilled per protocol.

## 2024-07-23 DIAGNOSIS — J45.40 MODERATE PERSISTENT ASTHMA WITHOUT COMPLICATION (HCC): ICD-10-CM

## 2024-07-23 DIAGNOSIS — Z91.09 ENVIRONMENTAL ALLERGIES: ICD-10-CM

## 2024-07-23 RX ORDER — ALBUTEROL SULFATE 90 UG/1
2 AEROSOL, METERED RESPIRATORY (INHALATION) EVERY 6 HOURS PRN
Qty: 6.7 EACH | Refills: 1 | Status: SHIPPED | OUTPATIENT
Start: 2024-07-23

## 2024-07-23 NOTE — TELEPHONE ENCOUNTER
Requested Prescriptions     Pending Prescriptions Disp Refills    ALBUTEROL 108 (90 Base) MCG/ACT Inhalation Aero Soln [Pharmacy Med Name: ALBUTEROL HFA (PROVENTIL) INH] 6.7 each 2     Sig: INHALE 2 PUFFS INTO THE LUNGS EVERY 6 HOURS AS NEEDED FOR WHEEZE     LOV 2/14/2024     Patient was asked to follow-up in: 6 months    Appointment scheduled: 8/7/2024 Jamar Palumbo MD     Medication refilled per protocol.

## 2024-09-26 DIAGNOSIS — F41.1 ANXIETY STATE: ICD-10-CM

## 2024-09-27 ENCOUNTER — TELEPHONE (OUTPATIENT)
Dept: FAMILY MEDICINE CLINIC | Facility: CLINIC | Age: 49
End: 2024-09-27

## 2024-09-27 DIAGNOSIS — F41.1 ANXIETY STATE: ICD-10-CM

## 2024-09-29 DIAGNOSIS — Z91.09 ENVIRONMENTAL ALLERGIES: ICD-10-CM

## 2024-09-29 DIAGNOSIS — J45.40 MODERATE PERSISTENT ASTHMA WITHOUT COMPLICATION (HCC): ICD-10-CM

## 2024-09-30 RX ORDER — CITALOPRAM HYDROBROMIDE 40 MG/1
40 TABLET ORAL DAILY
Qty: 90 TABLET | Refills: 0 | Status: SHIPPED | OUTPATIENT
Start: 2024-09-30

## 2024-09-30 NOTE — TELEPHONE ENCOUNTER
Patient calling for an update on medication request needing a refill before he leaves out of town Thursday. Has an appointment with Dr. Palumbo on 10/11/2024.     [facility]/pharmacy #[id number] - [place], [place] - [address] -[phone]

## 2024-09-30 NOTE — TELEPHONE ENCOUNTER
LOV 2/14/2024 (in-office) - advised to f/u in 6 months    Future Appointments   Date Time Provider Department Center   10/11/2024 11:30 AM Jamar Palumbo MD [facility]      Refill sent x 1

## 2024-10-02 RX ORDER — ALBUTEROL SULFATE 90 UG/1
2 INHALANT RESPIRATORY (INHALATION) EVERY 6 HOURS PRN
Qty: 6.7 EACH | Refills: 5 | Status: SHIPPED | OUTPATIENT
Start: 2024-10-02

## 2024-10-02 RX ORDER — CITALOPRAM HYDROBROMIDE 40 MG/1
40 TABLET ORAL DAILY
Qty: 90 TABLET | Refills: 3 | OUTPATIENT
Start: 2024-10-02

## 2024-10-02 NOTE — TELEPHONE ENCOUNTER
CITALOPRAM HBR 40 MG TABLET          Will file in chart as:     The original prescription was reordered on 9/30/2024 by Ashley Marquez, RN. Renewing this prescription may not be appropriate.     Possible duplicate: Hover to review recent actions on this medication    Sig: TAKE 1 TABLET BY MOUTH EVERY DAY    Disp: 90 tablet    Refills: 3    Start: 9/26/2024    Class: Normal    Non-formulary For: Anxiety state    Last ordered: 12 months ago (10/5/2023) by Jamar Palumbo MD    Last refill: 6/30/2024    Rx #: [id number]    Psychiatric Non-Scheduled (Anti-Anxiety) Passed

## 2024-10-02 NOTE — TELEPHONE ENCOUNTER
ALBUTEROL HFA (PROVENTIL) INH     Sig: INHALE 2 PUFFS INTO THE LUNGS EVERY 6 HOURS AS NEEDED FOR WHEEZE    Disp: 6.7 each    Refills: 1    Start: 9/29/2024    Class: Normal    Non-formulary For: Moderate persistent asthma without complication (HCC), Environmental allergies    Last ordered: 2 months ago (7/23/2024) by Jamar Palumbo MD    Last refill: 8/27/2024    Rx #: [id number]    Asthma & COPD Medication Protocol Passed

## 2024-10-11 ENCOUNTER — OFFICE VISIT (OUTPATIENT)
Dept: FAMILY MEDICINE CLINIC | Facility: CLINIC | Age: 49
End: 2024-10-11
Payer: COMMERCIAL

## 2024-10-11 VITALS
BODY MASS INDEX: 22.85 KG/M2 | HEART RATE: 74 BPM | SYSTOLIC BLOOD PRESSURE: 130 MMHG | WEIGHT: 172.38 LBS | HEIGHT: 73 IN | DIASTOLIC BLOOD PRESSURE: 76 MMHG | RESPIRATION RATE: 10 BRPM

## 2024-10-11 DIAGNOSIS — J45.40 MODERATE PERSISTENT ASTHMA WITHOUT COMPLICATION (HCC): ICD-10-CM

## 2024-10-11 DIAGNOSIS — Z01.89 ROUTINE LAB DRAW: ICD-10-CM

## 2024-10-11 DIAGNOSIS — I10 ESSENTIAL HYPERTENSION: Chronic | ICD-10-CM

## 2024-10-11 DIAGNOSIS — E78.2 MIXED HYPERLIPIDEMIA: ICD-10-CM

## 2024-10-11 DIAGNOSIS — Z91.09 ENVIRONMENTAL ALLERGIES: ICD-10-CM

## 2024-10-11 DIAGNOSIS — L40.9 PSORIASIS: ICD-10-CM

## 2024-10-11 DIAGNOSIS — E11.65 TYPE 2 DIABETES MELLITUS WITH HYPERGLYCEMIA, WITHOUT LONG-TERM CURRENT USE OF INSULIN (HCC): Primary | ICD-10-CM

## 2024-10-11 DIAGNOSIS — R79.89 ELEVATED LFTS: ICD-10-CM

## 2024-10-11 PROCEDURE — 99214 OFFICE O/P EST MOD 30 MIN: CPT | Performed by: FAMILY MEDICINE

## 2024-10-11 RX ORDER — TRIAMCINOLONE ACETONIDE 1 MG/G
CREAM TOPICAL 2 TIMES DAILY PRN
Qty: 80 G | Refills: 3 | Status: SHIPPED | OUTPATIENT
Start: 2024-10-11

## 2024-10-11 NOTE — ASSESSMENT & PLAN NOTE
Asthma (moderate persistent) is under Excellent control and Good compliance. Asthma controller Meds: fluticasone-salmeterol Aepb - 250-50 MCG/ACT  Asthma rescue Meds: albuterol Aers - 108 (90 Base) MCG/ACT

## 2024-10-11 NOTE — ASSESSMENT & PLAN NOTE
BP shows borderline control with last BP of 120/80. Work on lifestyle changes, diet, exercise and weight management.   1/18/2024: POTASSIUM 4.3; CREATININE 0.80; EGFR 109  BP Meds: losartan Tabs - 50 MG   ACE/ARB Adherence Good at 98%

## 2024-10-11 NOTE — ASSESSMENT & PLAN NOTE
Cholesterol shows Good control. Long term heart-healthy diet and lifestyle discussed and encouraged to reduce risk of cardiovascular disease.  1/18/2024: CHOLESTEROL, TOTAL 164; HDL CHOLESTEROL 62; TRIGLYCERIDES 96; LDL-CHOLESTEROL 83  Cholesterol Meds: atorvastatin Tabs - 20 MG  stable  Continue with current treatment plan

## 2024-10-11 NOTE — ASSESSMENT & PLAN NOTE
Diabetes: A1c is 5.3 done 1/18/2024 which shows too much glucose control. Adjusting meds and lifestyle as listed.   DM Meds: semaglutide Sopn - 4 MG/3ML   Oral Diabetes Med Adherence Fair at 85%, working onbetter complkristin   Diabetic Complications:   None  Diabetes is : improving with lifestyle modifications Continue current treatment regimen. Reassess Diabetes in : in 6 months

## 2024-10-11 NOTE — PROGRESS NOTES
had concerns including Diabetes (Follow up ).   Amrit Ross is a 49 year old male who presents for recheck of his diabetes.  Subjective:   Stable function. Good A1c. Good weight on lower dose (0.5 mg per day)  Last A1c value was 5.3% done 1/18/2024.     Health Maintenance Due   Topic Date Due    Diabetes Care: Microalb/Creat Ratio  Never done    Pneumococcal Vaccine: Birth to 64yrs (1 of 2 - PCV) Never done    Diabetes Care Foot Exam  02/28/2019    Diabetes Care Dilated Eye Exam  11/18/2020    Diabetes Care A1C  07/18/2024    COVID-19 Vaccine (4 - 2023-24 season) 09/01/2024    Influenza Vaccine (1) 10/01/2024        Objective:    Labs, meds and PMSFHx reviewed, see Reviewed tab in Encounter     Wt Readings from Last 3 Encounters:   10/11/24 172 lb 6.4 oz (78.2 kg)   02/14/24 152 lb (68.9 kg)   06/09/23 172 lb 3.2 oz (78.1 kg)     BP Readings from Last 3 Encounters:   10/11/24 130/76   02/14/24 120/80   06/09/23 130/84         HPI     Review of Systems   Constitutional: Negative.  Negative for activity change, appetite change, chills and fever.   HENT: Negative.     Eyes: Negative.    Respiratory: Negative.  Negative for shortness of breath.    Cardiovascular: Negative.  Negative for chest pain and palpitations.   Gastrointestinal: Negative.  Negative for abdominal pain.   Genitourinary: Negative.  Negative for dysuria.   Musculoskeletal:  Negative for arthralgias.   Skin: Negative.  Negative for rash.   Allergic/Immunologic: Negative.    Neurological: Negative.         /76   Pulse 74   Resp 10   Ht 6' 1\" (1.854 m)   Wt 172 lb 6.4 oz (78.2 kg)   BMI 22.75 kg/m²  Estimated body mass index is 22.75 kg/m² as calculated from the following:    Height as of this encounter: 6' 1\" (1.854 m).    Weight as of this encounter: 172 lb 6.4 oz (78.2 kg).   Physical Exam  Vitals and nursing note reviewed.   Constitutional:       General: He is not in acute distress.     Appearance: Normal appearance.   HENT:      Head:  Normocephalic.   Cardiovascular:      Rate and Rhythm: Normal rate and regular rhythm.      Pulses:           Posterior tibial pulses are 2+ on the right side and 2+ on the left side.      Heart sounds: Normal heart sounds. No murmur heard.  Pulmonary:      Effort: Pulmonary effort is normal. No respiratory distress.      Breath sounds: Normal breath sounds. No wheezing.   Abdominal:      General: Bowel sounds are normal.      Palpations: Abdomen is soft.      Tenderness: There is no abdominal tenderness.   Musculoskeletal:      Cervical back: Normal range of motion.      Right lower leg: No edema.      Left lower leg: No edema.      Right foot: No deformity.      Left foot: No deformity.   Feet:      Right foot:      Protective Sensation: 6 sites tested.  6 sites sensed.      Skin integrity: Skin integrity normal. No ulcer.      Left foot:      Protective Sensation: 6 sites tested.  6 sites sensed.      Skin integrity: Skin integrity normal. No ulcer.   Skin:     General: Skin is warm and dry.      Findings: No rash.   Neurological:      Mental Status: He is alert and oriented to person, place, and time.   Psychiatric:         Mood and Affect: Mood normal.         Behavior: Behavior normal.         Thought Content: Thought content normal.         Judgment: Judgment normal.        Nursing note and vitals reviewed.       Assessment & Plan:    The patient indicates understanding of these issues and agrees to the plan.    1. Type 2 diabetes mellitus with hyperglycemia, without long-term current use of insulin (HCC) (Primary)  Overview:  Dx 2015, A1c up to 9.0 7/13/2016.   and ozempic 11/22/2019   Assessment & Plan:  Diabetes: A1c is 5.3 done 1/18/2024 which shows too much glucose control. Adjusting meds and lifestyle as listed.   DM Meds: semaglutide Sopn - 4 MG/3ML   Oral Diabetes Med Adherence Fair at 85%, working onbetter complinace   Diabetic Complications:   None  Diabetes is : improving with lifestyle  modifications Continue current treatment regimen. Reassess Diabetes in : in 6 months   Orders:  -     Semaglutide (1 MG/DOSE); Inject 1 mg into the skin once a week.  Dispense: 3 each; Refill: 3  -     Comp Metabolic Panel (14)  -     Lipid Panel  -     Hemoglobin A1C  -     Microalb/Creat Ratio, Random Urine  2. Mixed hyperlipidemia  Overview:  Atorvastatin 20  Assessment & Plan:  Cholesterol shows Good control. Long term heart-healthy diet and lifestyle discussed and encouraged to reduce risk of cardiovascular disease.  1/18/2024: CHOLESTEROL, TOTAL 164; HDL CHOLESTEROL 62; TRIGLYCERIDES 96; LDL-CHOLESTEROL 83  Cholesterol Meds: atorvastatin Tabs - 20 MG  stable  Continue with current treatment plan   3. Essential hypertension  Overview:  Quinapril 10, diltiazem 180 ER (Because of elevated resting HR of )  Assessment & Plan:  BP shows borderline control with last BP of 120/80. Work on lifestyle changes, diet, exercise and weight management.   1/18/2024: POTASSIUM 4.3; CREATININE 0.80; EGFR 109  BP Meds: losartan Tabs - 50 MG   ACE/ARB Adherence Good at 98%    4. Moderate persistent asthma without complication (HCC)  Overview:  Symbicort 160 BID, Proair HFA  Assessment & Plan:  Asthma (moderate persistent) is under Excellent control and Good compliance. Asthma controller Meds: fluticasone-salmeterol Aepb - 250-50 MCG/ACT  Asthma rescue Meds: albuterol Aers - 108 (90 Base) MCG/ACT   5. Elevated LFTs  Overview:  1/18/2024: ALT 92 (H); AST 69 (H) with Diabetes Mellitus under great control  Assessment & Plan:  1/18/2024: ALT 92 (H); AST 69 (H) continue to monito and work on wt loss.   6. Environmental allergies  -     Triamcinolone Acetonide; Apply topically 2 (two) times daily as needed.  Dispense: 80 g; Refill: 3  7. Psoriasis  Overview:  sBetamethasone PRN rash  Orders:  -     Triamcinolone Acetonide; Apply topically 2 (two) times daily as needed.  Dispense: 80 g; Refill: 3  8. Routine lab draw  -     Comp  Metabolic Panel (14)  -     Lipid Panel  -     CBC With Differential With Platelet  -     Hemoglobin A1C  -     Microalb/Creat Ratio, Random Urine  -     TSH W Reflex To Free T4  -     PSA Total, Diagnostic     I have discontinued Amrit Belterra's busPIRone. I have also changed his triamcinolone. Additionally, I am having him maintain his traZODone, cetirizine, fluticasone-salmeterol, BD Pen Needle Fabiola U/F, clonazePAM, losartan, atorvastatin, citalopram, albuterol, and semaglutide.     Return in about 6 months (around 4/11/2025) for Annual physical, diabetes follow up.

## 2024-10-28 NOTE — LETTER
ASTHMA ACTION PLAN for Moreno Cutler     : 3/10/1975     Date: 19  Doctor:  Dariusz Yuen MD  Phone for doctor or clinic: [address] [facility], [address] [facility], [address] [address] [address] ,[address], [address]  [address] [address] 3. Red - Stop!  Danger! <50% Personal [employer] Peak Flow  Continue Controller Medications But ADD:   Medicine not helping  Breathing is hard and fast  Nose opens wide  Can't walk  Ribs show  Can't talk well Medicine How much to take When to take Awake/Alert

## 2024-10-29 DIAGNOSIS — E78.2 MIXED HYPERLIPIDEMIA: ICD-10-CM

## 2024-11-02 NOTE — TELEPHONE ENCOUNTER
Requested Prescriptions     Pending Prescriptions Disp Refills    ATORVASTATIN 20 MG Oral Tab [Pharmacy Med Name: ATORVASTATIN TAB 20MG] 90 tablet 1     Sig: TAKE 1 TABLET DAILY     LOV 10/11/2024     Patient was asked to follow-up in: 6 months    Appointment scheduled: Visit date not found     Medication not refilled per protocol.    Rx #: [id number]     Cholesterol Medication Protocol Jolacr67/29/2024 04:53 PM   Protocol Details ALT < 80    ALT resulted within past year    Lipid panel within past 12 months    In person appointment or virtual visit in the past 12 mos or appointment in next 3 mos

## 2024-11-03 RX ORDER — ATORVASTATIN CALCIUM 20 MG/1
20 TABLET, FILM COATED ORAL DAILY
Qty: 90 TABLET | Refills: 4 | Status: SHIPPED | OUTPATIENT
Start: 2024-11-03

## 2024-12-09 DIAGNOSIS — F41.1 ANXIETY STATE: ICD-10-CM

## 2024-12-13 RX ORDER — CLONAZEPAM 2 MG/1
2 TABLET ORAL 2 TIMES DAILY PRN
Qty: 180 TABLET | Refills: 1 | Status: SHIPPED | OUTPATIENT
Start: 2024-12-13

## 2024-12-13 NOTE — TELEPHONE ENCOUNTER
Requested Prescriptions     Pending Prescriptions Disp Refills    CLONAZEPAM 2 MG Oral Tab [Pharmacy Med Name: CLONAZEPAM TAB 2MG] 180 tablet 1     Sig: TAKE 1 TABLET TWICE A DAY  AS NEEDED FOR ANXIETY     LOV 10/11/2024     Patient was asked to follow-up in: 6 months    Appointment scheduled: Visit date not found     Medication failed  protocol.    Routed to front staff    Patient is due for their annual physical and A1C please call patient and schedule an appt

## 2024-12-14 DIAGNOSIS — E11.65 TYPE 2 DIABETES MELLITUS WITH HYPERGLYCEMIA, WITHOUT LONG-TERM CURRENT USE OF INSULIN (HCC): ICD-10-CM

## 2024-12-17 RX ORDER — SEMAGLUTIDE 1.34 MG/ML
1 INJECTION, SOLUTION SUBCUTANEOUS
Qty: 3 ML | Refills: 11 | Status: SHIPPED | OUTPATIENT
Start: 2024-12-17

## 2024-12-17 NOTE — TELEPHONE ENCOUNTER
Requested Prescriptions     Signed Prescriptions Disp Refills    semaglutide (OZEMPIC, 1 MG/DOSE,) 4 MG/3ML Subcutaneous Solution Pen-injector 3 mL 11     Sig: INJECT 1MG INTO THE SKIN ONCE A WEEK     Authorizing Provider: SUSAN RUSSELL     Ordering User: MACY QUINN      Refilled per protocol/OV notes

## 2024-12-19 DIAGNOSIS — F41.1 ANXIETY STATE: ICD-10-CM

## 2024-12-19 RX ORDER — CLONAZEPAM 2 MG/1
2 TABLET ORAL 2 TIMES DAILY PRN
Qty: 180 TABLET | Refills: 1 | OUTPATIENT
Start: 2024-12-19

## 2024-12-19 NOTE — TELEPHONE ENCOUNTER
Refilled 6 days ago    Requested Prescriptions     Refused Prescriptions Disp Refills    clonazePAM 2 MG Oral Tab 180 tablet 1     Sig: Take 1 tablet (2 mg total) by mouth 2 (two) times daily as needed for Anxiety.     Refused By: MACY QUINN     Reason for Refusal: Duplicate refill request

## 2024-12-26 DIAGNOSIS — F41.1 ANXIETY STATE: ICD-10-CM

## 2024-12-26 RX ORDER — CITALOPRAM HYDROBROMIDE 40 MG/1
40 TABLET ORAL DAILY
Qty: 90 TABLET | Refills: 0 | Status: SHIPPED | OUTPATIENT
Start: 2024-12-26

## 2024-12-26 NOTE — TELEPHONE ENCOUNTER
Requested Prescriptions     Signed Prescriptions Disp Refills    CITALOPRAM 40 MG Oral Tab 90 tablet 0     Sig: TAKE 1 TABLET BY MOUTH EVERY DAY     Authorizing Provider: SUSAN RUSSELL     Ordering User: MACY QUINN      Refilled per protocol/OV notes

## 2025-01-21 DIAGNOSIS — F41.1 ANXIETY STATE: ICD-10-CM

## 2025-01-25 NOTE — TELEPHONE ENCOUNTER
Requested Prescriptions     Pending Prescriptions Disp Refills    CLONAZEPAM 2 MG Oral Tab [Pharmacy Med Name: CLONAZEPAM TAB 2MG] 180 tablet 1     Sig: TAKE 1 TABLET TWICE A DAY  AS NEEDED FOR ANXIETY        Last refill: 12/13/24 180 tabs 1 refill    Last Appointment: LOV 10/11/2024     Next Appointment: Visit date not found

## 2025-01-26 RX ORDER — CLONAZEPAM 2 MG/1
2 TABLET ORAL 2 TIMES DAILY PRN
Qty: 180 TABLET | Refills: 1 | Status: SHIPPED | OUTPATIENT
Start: 2025-01-26

## 2025-03-26 DIAGNOSIS — F41.1 ANXIETY STATE: ICD-10-CM

## 2025-03-26 NOTE — TELEPHONE ENCOUNTER
Refill request for:    Requested Prescriptions     Pending Prescriptions Disp Refills    CITALOPRAM 40 MG Oral Tab [Pharmacy Med Name: CITALOPRAM HBR 40 MG TABLET] 90 tablet 0     Sig: TAKE 1 TABLET BY MOUTH EVERY DAY        Psychiatric Non-Scheduled (Anti-Anxiety) [id number]/26/2025 12:05 AM   Protocol Details Depression Screening completed within the past 12 months    In person appointment or virtual visit in the past 6 mos or appointment in next 3 mos    Medication is active on med list          Last Prescribed Quantity Refills   12/26/2024 90 0     LOV 10/11/2024     Patient was asked to follow-up in: 6 months    Appointment scheduled: Visit date not found    Medication not on protocol.     # 90 with 0 refills routed to Jamar Palumbo MD for review

## 2025-03-27 DIAGNOSIS — F41.1 ANXIETY STATE: ICD-10-CM

## 2025-03-27 NOTE — TELEPHONE ENCOUNTER
Refill request for:    Requested Prescriptions     Pending Prescriptions Disp Refills    citalopram 40 MG Oral Tab 90 tablet 0     Sig: Take 1 tablet (40 mg total) by mouth daily.        Last Prescribed Quantity Refills   12/26/24 90 0     LOV 10/11/2024     Patient was asked to follow-up in: 6 months    Appointment scheduled: Visit date not found    Medication not on protocol.     Message sent to pt via [employer], due for appointment, needs to schedule one.       # 90 with 0 refills routed to Jamar Palumbo MD for review

## 2025-03-28 RX ORDER — CITALOPRAM HYDROBROMIDE 40 MG/1
40 TABLET ORAL DAILY
Qty: 90 TABLET | Refills: 0 | Status: SHIPPED | OUTPATIENT
Start: 2025-03-28

## 2025-03-28 RX ORDER — CITALOPRAM HYDROBROMIDE 40 MG/1
40 TABLET ORAL DAILY
Qty: 90 TABLET | Refills: 0 | OUTPATIENT
Start: 2025-03-28

## 2025-03-31 DIAGNOSIS — I10 ESSENTIAL HYPERTENSION: Chronic | ICD-10-CM

## 2025-04-01 RX ORDER — LOSARTAN POTASSIUM 50 MG/1
50 TABLET ORAL DAILY
Qty: 90 TABLET | Refills: 3 | Status: SHIPPED | OUTPATIENT
Start: 2025-04-01

## 2025-04-01 NOTE — TELEPHONE ENCOUNTER
Failed protocol    Requested Prescriptions     Pending Prescriptions Disp Refills    LOSARTAN 50 MG Oral Tab [Pharmacy Med Name: LOSARTAN POT TAB 50MG] 90 tablet 3     Sig: TAKE 1 TABLET DAILY        Last refill: 5/29/24 90 tabs 3 refills    Last Appointment: LOV 10/11/2024     Next Appointment: 5/8/2025 Jamar Palumbo MD

## 2025-05-08 ENCOUNTER — OFFICE VISIT (OUTPATIENT)
Dept: FAMILY MEDICINE CLINIC | Facility: CLINIC | Age: 50
End: 2025-05-08
Payer: COMMERCIAL

## 2025-05-08 VITALS
HEART RATE: 80 BPM | RESPIRATION RATE: 14 BRPM | WEIGHT: 168 LBS | SYSTOLIC BLOOD PRESSURE: 130 MMHG | BODY MASS INDEX: 22.26 KG/M2 | OXYGEN SATURATION: 96 % | HEIGHT: 73 IN | DIASTOLIC BLOOD PRESSURE: 68 MMHG

## 2025-05-08 DIAGNOSIS — I10 ESSENTIAL HYPERTENSION: Chronic | ICD-10-CM

## 2025-05-08 DIAGNOSIS — E11.65 TYPE 2 DIABETES MELLITUS WITH HYPERGLYCEMIA, WITHOUT LONG-TERM CURRENT USE OF INSULIN (HCC): Primary | ICD-10-CM

## 2025-05-08 DIAGNOSIS — J45.40 MODERATE PERSISTENT ASTHMA WITHOUT COMPLICATION (HCC): ICD-10-CM

## 2025-05-08 DIAGNOSIS — Z91.09 ENVIRONMENTAL ALLERGIES: ICD-10-CM

## 2025-05-08 DIAGNOSIS — F41.1 ANXIETY STATE: ICD-10-CM

## 2025-05-08 DIAGNOSIS — Z01.89 ROUTINE LAB DRAW: ICD-10-CM

## 2025-05-08 DIAGNOSIS — M75.51 ACUTE BURSITIS OF RIGHT SHOULDER: ICD-10-CM

## 2025-05-08 DIAGNOSIS — E78.2 MIXED HYPERLIPIDEMIA: ICD-10-CM

## 2025-05-08 PROCEDURE — 99214 OFFICE O/P EST MOD 30 MIN: CPT | Performed by: FAMILY MEDICINE

## 2025-05-08 RX ORDER — ALBUTEROL SULFATE 90 UG/1
2 INHALANT RESPIRATORY (INHALATION) EVERY 6 HOURS PRN
Qty: 6.7 EACH | Refills: 5 | Status: SHIPPED | OUTPATIENT
Start: 2025-05-08

## 2025-05-08 RX ORDER — CLONAZEPAM 2 MG/1
2 TABLET ORAL 2 TIMES DAILY PRN
Qty: 180 TABLET | Refills: 1 | Status: SHIPPED | OUTPATIENT
Start: 2025-05-08

## 2025-05-08 RX ORDER — CITALOPRAM HYDROBROMIDE 40 MG/1
40 TABLET ORAL DAILY
Qty: 90 TABLET | Refills: 0 | Status: SHIPPED | OUTPATIENT
Start: 2025-05-08

## 2025-05-08 NOTE — ASSESSMENT & PLAN NOTE
Asthma (mod persisitent is under Excellent control and Good compliance. Asthma controller Meds: fluticasone-salmeterol Aepb - 250-50 MCG/ACT  Asthma rescue Meds: albuterol Aers - 108 (90 Base) MCG/ACT     Orders:    Albuterol Sulfate HFA; Inhale 2 puffs into the lungs every 6 (six) hours as needed for Wheezing.  Dispense: 6.7 each; Refill: 5     Azathioprine Pregnancy And Lactation Text: This medication is Pregnancy Category D and isn't considered safe during pregnancy. It is unknown if this medication is excreted in breast milk.

## 2025-05-08 NOTE — ASSESSMENT & PLAN NOTE
Orders:    clonazePAM; Take 1 tablet (2 mg total) by mouth 2 (two) times daily as needed for Anxiety.  Dispense: 180 tablet; Refill: 1    Citalopram Hydrobromide; Take 1 tablet (40 mg total) by mouth daily.  Dispense: 90 tablet; Refill: 0

## 2025-05-08 NOTE — ASSESSMENT & PLAN NOTE
BP shows good control with last BP of 130/68. Continue lifestyle changes, diet, exercise and weight loss.   1/18/2024: POTASSIUM 4.3; CREATININE 0.80; EGFR 109  BP Meds: losartan Tabs - 50 MG   ACE/ARB Adherence Excellent at 100%

## 2025-05-08 NOTE — PROGRESS NOTES
Subjective:   Amrit is a 50 year old male coming in for had concerns including Diabetes (Follow up ), Shoulder Pain (Slight pain, might be a pinched nerve would like to get it looked at ), and Medication Request (Needs all medications refilled ).   HPI  History of Present Illness  Amrit Ross is a 50 year old male with asthma and diabetes who presents for a six-month follow-up.    He has been experiencing discomfort in his right arm for the past couple of weeks, suspecting a pinched nerve possibly related to his sleeping position. The discomfort is more pronounced in the upper arm, almost extending to the ear, and he confirms sleeping on the affected side. No significant pain during range of motion tests, although discomfort is noted during certain movements.    He has not had any blood work in the last six months. Previous blood work was done in October. His weight increased in October but has since decreased slightly. He monitors his blood sugar levels and reports an A1c of 5.8, indicating good control of his diabetes. He is careful with his diet, avoiding eating after a certain time and limiting sugar intake.    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 This stress is noted to have impacted his blood pressure.    Current medications include losartan, atorvastatin, and Ozempic at one milligram, which he has been using since December with sufficient refills. His albuterol is out, and he is unsure about the status of his Celexa and clonazepam prescriptions. He received a clonazepam refill in January, with one refill remaining. The albuterol inhaler brand changes frequently, which he finds confusing, but he is indifferent to the specific brand as long as it is covered by insurance.     Last A1c value was 5.3% done 1/18/2024.     /68   Pulse 80   Resp 14   Ht 6' 1\"  (1.854 m)   Wt 168 lb (76.2 kg)   SpO2 96%   BMI 22.16 kg/m²  Body mass index is 22.16 kg/m².   Physical Exam  Vitals and nursing note reviewed.   Constitutional:       General: He is not in acute distress.     Appearance: Normal appearance.   HENT:      Head: Normocephalic.   Cardiovascular:      Rate and Rhythm: Normal rate and regular rhythm.      Pulses:           Posterior tibial pulses are 2+ on the right side and 2+ on the left side.      Heart sounds: Normal heart sounds. No murmur heard.  Pulmonary:      Effort: Pulmonary effort is normal. No respiratory distress.      Breath sounds: Normal breath sounds. No wheezing.   Abdominal:      General: Bowel sounds are normal.      Palpations: Abdomen is soft.      Tenderness: There is no abdominal tenderness.   Musculoskeletal:      Cervical back: Normal range of motion.      Right lower leg: No edema.      Left lower leg: No edema.      Right foot: No deformity.      Left foot: No deformity.   Feet:      Right foot:      Protective Sensation: 6 sites tested.  6 sites sensed.      Skin integrity: Skin integrity normal. No ulcer.      Left foot:      Protective Sensation: 6 sites tested.  6 sites sensed.      Skin integrity: Skin integrity normal. No ulcer.   Skin:     General: Skin is warm and dry.      Findings: No rash.   Neurological:      Mental Status: He is alert and oriented to person, place, and time.   Psychiatric:         Mood and Affect: Mood normal.         Behavior: Behavior normal.         Thought Content: Thought content normal.         Judgment: Judgment normal.        Physical Exam  MUSCULOSKELETAL: Right shoulder range of motion limited, no arthritis or crepitus. Sensation intact in feet.      Wt Readings from Last 5 Encounters:   05/08/25 168 lb (76.2 kg)   10/11/24 172 lb 6.4 oz (78.2 kg)   02/14/24 152 lb (68.9 kg)   06/09/23 172 lb 3.2 oz (78.1 kg)   06/23/22 165 lb 3.2 oz (74.9 kg)      Results  LABS  Hemoglobin A1c: 5.8%     Assessment &  Plan  Type 2 diabetes mellitus with hyperglycemia, without long-term current use of insulin (HCC)  Diabetes: A1c is 5.3 done 1/18/2024 which shows too much glucose control. Adjusting meds and lifestyle as listed.  Last External A1c was 9.   DM Meds: Ozempic (1 MG/DOSE) Sopn - 4 MG/3ML      Diabetic Complications:      Diabetes is : stable Continue current treatment regimen. Reassess Diabetes in : in 6 months     Orders:    Comp Metabolic Panel (14)    Lipid Panel    Hemoglobin A1C    Microalb/Creat Ratio, Random Urine    Mixed hyperlipidemia  Cholesterol shows Good control. Long term heart-healthy diet and lifestyle discussed and encouraged to reduce risk of cardiovascular disease.  1/18/2024: CHOLESTEROL, TOTAL 164; HDL CHOLESTEROL 62; TRIGLYCERIDES 96; LDL-CHOLESTEROL 83  Cholesterol Meds: atorvastatin Tabs - 20 MG  stable  Continue with current treatment plan          Essential hypertension  BP shows good control with last BP of 130/68. Continue lifestyle changes, diet, exercise and weight loss.   1/18/2024: POTASSIUM 4.3; CREATININE 0.80; EGFR 109  BP Meds: losartan Tabs - 50 MG   ACE/ARB Adherence Excellent at 100%           Moderate persistent asthma without complication (HCC)  Asthma (mod persisitent is under Excellent control and Good compliance. Asthma controller Meds: fluticasone-salmeterol Aepb - 250-50 MCG/ACT  Asthma rescue Meds: albuterol Aers - 108 (90 Base) MCG/ACT     Orders:    Albuterol Sulfate HFA; Inhale 2 puffs into the lungs every 6 (six) hours as needed for Wheezing.  Dispense: 6.7 each; Refill: 5    Routine lab draw    Orders:    Comp Metabolic Panel (14)    Lipid Panel    CBC With Differential With Platelet    Hemoglobin A1C    Microalb/Creat Ratio, Random Urine    TSH W Reflex To Free T4    PSA Total, Diagnostic    Environmental allergies    Orders:    Albuterol Sulfate HFA; Inhale 2 puffs into the lungs every 6 (six) hours as needed for Wheezing.  Dispense: 6.7 each; Refill: 5    Anxiety  state    Orders:    clonazePAM; Take 1 tablet (2 mg total) by mouth 2 (two) times daily as needed for Anxiety.  Dispense: 180 tablet; Refill: 1    Citalopram Hydrobromide; Take 1 tablet (40 mg total) by mouth daily.  Dispense: 90 tablet; Refill: 0    Acute bursitis of right shoulder  Home Exercise Program given, follow up if no change.                 Assessment & Plan  Right shoulder bursitis  Right shoulder pain likely due to rotator cuff tendonitis or bursitis. No arthritis or surgical need. Good strength. Common post-35. Most cases improve with exercises, cortisone injections not needed.  - Recommend shoulder exercises: pendulum swings, range of motion, wall pushups.  - Advise extra pillow while sleeping to reduce impingement.    Essential (primary) hypertension  Blood pressure elevated, likely stress-related from job changes.    Type 2 diabetes mellitus without complications  Diabetes well-controlled. Hemoglobin A1c self-reported at 5.8%.  - Reorder blood work including A1c and urine test.    Asthma, unspecified  Albuterol inhaler requires prior authorization. Reports variations in inhaler appearance due to different generics.  - Prior authorize albuterol inhaler.  - Provide refills for albuterol inhaler.    General Health Maintenance  Routine screenings and preventive measures discussed. Colon cancer screening overdue. Prostate screening to be considered around age 50 unless symptomatic.  - Schedule colon cancer screening.  - Include prostate test in upcoming blood work.  I have discontinued Amrit Ross's traZODone. I am also having him maintain his cetirizine, fluticasone-salmeterol, albuterol, triamcinolone, atorvastatin, Ozempic (1 MG/DOSE), clonazePAM, citalopram, and losartan.       Return in about 6 months (around 11/8/2025) for Annual physical, diabetes follow up.

## 2025-05-08 NOTE — PROGRESS NOTES
The following individual(s) verbally consented to be recorded using ambient AI listening technology and understand that they can each withdraw their consent to this listening technology at any point by asking the clinician to turn off or pause the recording:    Patient name: Amrit Arguetaartz

## 2025-05-08 NOTE — ASSESSMENT & PLAN NOTE
Diabetes: A1c is 5.3 done 1/18/2024 which shows too much glucose control. Adjusting meds and lifestyle as listed.  Last External A1c was 9.   DM Meds: Ozempic (1 MG/DOSE) Sopn - 4 MG/3ML      Diabetic Complications:      Diabetes is : stable Continue current treatment regimen. Reassess Diabetes in : in 6 months     Orders:    Comp Metabolic Panel (14)    Lipid Panel    Hemoglobin A1C    Microalb/Creat Ratio, Random Urine

## 2025-06-20 ENCOUNTER — PATIENT OUTREACH (OUTPATIENT)
Dept: FAMILY MEDICINE CLINIC | Facility: CLINIC | Age: 50
End: 2025-06-20

## 2025-06-30 ENCOUNTER — PATIENT OUTREACH (OUTPATIENT)
Dept: FAMILY MEDICINE CLINIC | Facility: CLINIC | Age: 50
End: 2025-06-30

## (undated) DIAGNOSIS — I10 ESSENTIAL HYPERTENSION WITH GOAL BLOOD PRESSURE LESS THAN 140/90: ICD-10-CM

## (undated) NOTE — Clinical Note
ASTHMA ACTION PLAN for Arn Hand     : 3/10/1975     Date: 3/2/2017  Provider:  Socorro Ramírez PA-C  Phone for doctor or clinic: [address] [facility], [address] [facility], [address] [place] [address] [address] [address] [place] [address] [place], [place] [id number]

## (undated) NOTE — MR AVS SNAPSHOT
[address] [facility] [address]  [facility],  [address] [facility] [id number]               Thank you for choosing us for your health care visit with Shira Isidro PA-C.   We are glad to serve you and happy to provide you with [facility] Albuterol Sulfate  (90 Base) MCG/ACT Aers   Inhale 2 puffs into the lungs every 6 (six) hours as needed for Wheezing. Commonly known as:  PROAIR HFA           Atorvastatin Calcium 20 MG Tabs   Take 1 tablet (20 mg total) by mouth daily.    Commonl Jul 13, 2016    HEMOGLOBIN A1C < 7.0 7/13/2016  9:53 AM by Gordo Thapa MD  No    Related Problems    [facility] ([facility]      MyChart     Visit MyChart  You can access your MyChart to more actively manage your health care and view more detail 2 ½ hours per week – spread out over time Use a kate to keep you motivated   Don’t forget strength training with weights and resistance Set goals and track your progress   You don’t need to join a gym. Home exercises work great.  Put more priority on exe

## (undated) NOTE — LETTER
07/31/17  Celeste Shah MD  • Aileen Walker MD • Herve Noriega MD • [address], [place]  Elvia Gonzales PA-C • ALEJA Lopes Do • Brandon Morse PA-C     Eye Exam Results for Diabetic Patients     As soon as the patient is seen please complete the

## (undated) NOTE — LETTER
ASTHMA ACTION PLAN for Armando Cordero     : 3/10/1975     Date: 2018  Provider:  Ame Juarez MD  Phone for doctor or clinic: 113[address] [facility] [place], [address] [facility], [address] [address] ,[address], [address]  [facility] [address] [address]